# Patient Record
Sex: FEMALE | Race: WHITE
[De-identification: names, ages, dates, MRNs, and addresses within clinical notes are randomized per-mention and may not be internally consistent; named-entity substitution may affect disease eponyms.]

---

## 2022-12-13 ENCOUNTER — HOSPITAL ENCOUNTER (INPATIENT)
Dept: HOSPITAL 47 - EC | Age: 82
LOS: 3 days | Discharge: HOME HEALTH | DRG: 871 | End: 2022-12-16
Attending: HOSPITALIST | Admitting: HOSPITALIST
Payer: MEDICARE

## 2022-12-13 VITALS — BODY MASS INDEX: 25.4 KG/M2

## 2022-12-13 DIAGNOSIS — I21.A1: ICD-10-CM

## 2022-12-13 DIAGNOSIS — J44.0: ICD-10-CM

## 2022-12-13 DIAGNOSIS — Z79.01: ICD-10-CM

## 2022-12-13 DIAGNOSIS — I07.1: ICD-10-CM

## 2022-12-13 DIAGNOSIS — I48.0: ICD-10-CM

## 2022-12-13 DIAGNOSIS — Z79.899: ICD-10-CM

## 2022-12-13 DIAGNOSIS — E87.1: ICD-10-CM

## 2022-12-13 DIAGNOSIS — N18.9: ICD-10-CM

## 2022-12-13 DIAGNOSIS — A41.50: Primary | ICD-10-CM

## 2022-12-13 DIAGNOSIS — E11.22: ICD-10-CM

## 2022-12-13 DIAGNOSIS — J15.6: ICD-10-CM

## 2022-12-13 DIAGNOSIS — I27.23: ICD-10-CM

## 2022-12-13 DIAGNOSIS — E87.20: ICD-10-CM

## 2022-12-13 DIAGNOSIS — J44.1: ICD-10-CM

## 2022-12-13 DIAGNOSIS — Z79.84: ICD-10-CM

## 2022-12-13 DIAGNOSIS — I50.33: ICD-10-CM

## 2022-12-13 DIAGNOSIS — J96.21: ICD-10-CM

## 2022-12-13 DIAGNOSIS — E11.65: ICD-10-CM

## 2022-12-13 DIAGNOSIS — Z20.822: ICD-10-CM

## 2022-12-13 DIAGNOSIS — Z99.81: ICD-10-CM

## 2022-12-13 DIAGNOSIS — Z79.51: ICD-10-CM

## 2022-12-13 DIAGNOSIS — Z87.891: ICD-10-CM

## 2022-12-13 DIAGNOSIS — Z79.85: ICD-10-CM

## 2022-12-13 LAB
ALBUMIN SERPL-MCNC: 4.7 G/DL (ref 3.5–5)
ALP SERPL-CCNC: 96 U/L (ref 38–126)
ALT SERPL-CCNC: 49 U/L (ref 4–34)
ANION GAP SERPL CALC-SCNC: 23 MMOL/L
APTT BLD: 22.1 SEC (ref 22–30)
AST SERPL-CCNC: 55 U/L (ref 14–36)
BASOPHILS # BLD AUTO: 0.1 K/UL (ref 0–0.2)
BASOPHILS NFR BLD AUTO: 0 %
BUN SERPL-SCNC: 31 MG/DL (ref 7–17)
CALCIUM SPEC-MCNC: 9.8 MG/DL (ref 8.4–10.2)
CHLORIDE SERPL-SCNC: 88 MMOL/L (ref 98–107)
CO2 SERPL-SCNC: 24 MMOL/L (ref 22–30)
EOSINOPHIL # BLD AUTO: 0.1 K/UL (ref 0–0.7)
EOSINOPHIL NFR BLD AUTO: 1 %
ERYTHROCYTE [DISTWIDTH] IN BLOOD BY AUTOMATED COUNT: 3.61 M/UL (ref 3.8–5.4)
ERYTHROCYTE [DISTWIDTH] IN BLOOD: 14.1 % (ref 11.5–15.5)
GLUCOSE SERPL-MCNC: 475 MG/DL (ref 74–99)
HCT VFR BLD AUTO: 36.2 % (ref 34–46)
HGB BLD-MCNC: 11.6 GM/DL (ref 11.4–16)
INR PPP: 1 (ref ?–1.2)
LYMPHOCYTES # SPEC AUTO: 1.8 K/UL (ref 1–4.8)
LYMPHOCYTES NFR SPEC AUTO: 11 %
MAGNESIUM SPEC-SCNC: 1.4 MG/DL (ref 1.6–2.3)
MCH RBC QN AUTO: 32.3 PG (ref 25–35)
MCHC RBC AUTO-ENTMCNC: 32.2 G/DL (ref 31–37)
MCV RBC AUTO: 100.3 FL (ref 80–100)
MONOCYTES # BLD AUTO: 0.8 K/UL (ref 0–1)
MONOCYTES NFR BLD AUTO: 5 %
NEUTROPHILS # BLD AUTO: 13.3 K/UL (ref 1.3–7.7)
NEUTROPHILS NFR BLD AUTO: 82 %
PLATELET # BLD AUTO: 404 K/UL (ref 150–450)
POTASSIUM SERPL-SCNC: 4.2 MMOL/L (ref 3.5–5.1)
PROT SERPL-MCNC: 7.5 G/DL (ref 6.3–8.2)
PT BLD: 10.9 SEC (ref 9–12)
SODIUM SERPL-SCNC: 135 MMOL/L (ref 137–145)
WBC # BLD AUTO: 16.2 K/UL (ref 3.8–10.6)

## 2022-12-13 PROCEDURE — 5A09357 ASSISTANCE WITH RESPIRATORY VENTILATION, LESS THAN 24 CONSECUTIVE HOURS, CONTINUOUS POSITIVE AIRWAY PRESSURE: ICD-10-PCS

## 2022-12-13 PROCEDURE — 93005 ELECTROCARDIOGRAM TRACING: CPT

## 2022-12-13 PROCEDURE — 94660 CPAP INITIATION&MGMT: CPT

## 2022-12-13 PROCEDURE — 83880 ASSAY OF NATRIURETIC PEPTIDE: CPT

## 2022-12-13 PROCEDURE — 99292 CRITICAL CARE ADDL 30 MIN: CPT

## 2022-12-13 PROCEDURE — 87636 SARSCOV2 & INF A&B AMP PRB: CPT

## 2022-12-13 PROCEDURE — 87040 BLOOD CULTURE FOR BACTERIA: CPT

## 2022-12-13 PROCEDURE — 96367 TX/PROPH/DG ADDL SEQ IV INF: CPT

## 2022-12-13 PROCEDURE — 96375 TX/PRO/DX INJ NEW DRUG ADDON: CPT

## 2022-12-13 PROCEDURE — 80053 COMPREHEN METABOLIC PANEL: CPT

## 2022-12-13 PROCEDURE — 85730 THROMBOPLASTIN TIME PARTIAL: CPT

## 2022-12-13 PROCEDURE — 85610 PROTHROMBIN TIME: CPT

## 2022-12-13 PROCEDURE — 96365 THER/PROPH/DIAG IV INF INIT: CPT

## 2022-12-13 PROCEDURE — 84484 ASSAY OF TROPONIN QUANT: CPT

## 2022-12-13 PROCEDURE — 36415 COLL VENOUS BLD VENIPUNCTURE: CPT

## 2022-12-13 PROCEDURE — 71045 X-RAY EXAM CHEST 1 VIEW: CPT

## 2022-12-13 PROCEDURE — 83605 ASSAY OF LACTIC ACID: CPT

## 2022-12-13 PROCEDURE — 99291 CRITICAL CARE FIRST HOUR: CPT

## 2022-12-13 PROCEDURE — 96368 THER/DIAG CONCURRENT INF: CPT

## 2022-12-13 PROCEDURE — 80048 BASIC METABOLIC PNL TOTAL CA: CPT

## 2022-12-13 PROCEDURE — 94760 N-INVAS EAR/PLS OXIMETRY 1: CPT

## 2022-12-13 PROCEDURE — 94640 AIRWAY INHALATION TREATMENT: CPT

## 2022-12-13 PROCEDURE — 93306 TTE W/DOPPLER COMPLETE: CPT

## 2022-12-13 PROCEDURE — 85025 COMPLETE CBC W/AUTO DIFF WBC: CPT

## 2022-12-13 PROCEDURE — 96366 THER/PROPH/DIAG IV INF ADDON: CPT

## 2022-12-13 PROCEDURE — 83036 HEMOGLOBIN GLYCOSYLATED A1C: CPT

## 2022-12-13 PROCEDURE — 83735 ASSAY OF MAGNESIUM: CPT

## 2022-12-13 PROCEDURE — 85379 FIBRIN DEGRADATION QUANT: CPT

## 2022-12-13 NOTE — XR
EXAMINATION TYPE: XR chest 1V portable

 

DATE OF EXAM: 12/13/2022

 

COMPARISON: NONE

 

HISTORY: Hypoxemia

 

TECHNIQUE: Single view

 

FINDINGS: Heart is normal. There is some minimal infiltrate and atelectasis right lung base. There ar
e no hilar masses. No heart failure. There is some coarse markings left lower lobe.

 

IMPRESSION: Interstitial infiltrates and atelectasis in the lower lung fields. Normal heart.

## 2022-12-13 NOTE — ED
General Adult HPI





- General


Chief complaint: Shortness of Breath


Stated complaint: SOB


Time Seen by Provider: 12/13/22 22:01


Source: patient


Mode of arrival: ambulatory


Limitations: no limitations





- History of Present Illness


Initial comments: 


Dictation was produced using dragon dictation software. please excuse any 

grammatical, word or spelling errors. 











Chief Complaint: 82-year-old female presents with dyspnea





History of present illness obtained from grandson and patient





History of Present Illness: Patient is an 82-year-old female with extensive 

history of COPD.  She moved within the month from [place] where most of her care

was performed.  Patient moved here recently to be with family.  Patient wears 6 

L nasal cannula usually at home.  States that her nose is been stuffed.  Denies 

any chest pain.  No fever or constitutional symptoms.  She has a baseline cough.

 She was 6 L of nasal cannula checked her oxygen today no 63%.  She had her 

first appointment with local pulmonologists here about a week ago.  She's been 

doing her breathing treatments around-the-clock.  Patient was shortness of 

breath is worse when she lies flat.  Denies any leg swelling.  No history of 

blood clots.  Does not taking any coagulation medications.








The ROS documented in this emergency department record has been reviewed and 

confirmed by me.  Those systems with pertinent positive or negative responses 

have been documented in the HPI.  All other systems are other negative and/or 

noncontributory.








PHYSICAL EXAM:


General Impression: Alert and oriented x3, dyspneic


HEENT: Normocephalic atraumatic, extra-ocular movements intact, pupils equal and

reactive to light bilaterally, dry mucous membranes


Cardiovascular: Heart regular rate and rhythm


Chest: 4-5 word sentences, no retractions, no tachypnea


Abdomen: abdomen soft, non-tender, non-distended, no organomegaly


Musculoskeletal: Pulses present and equal in all extremities, no peripheral 

edema


Motor:  no focal deficits noted


Neurological: CN II-XII grossly intact, no focal motor or sensory deficits noted


Skin: Intact with no visualized rashes


Psych: Normal affect and mood





ED course: 82-year-old female presents emergency department for shortness of 

breath 1 day.  Vital signs upon arrival shows 75% on room air, heart rate of 

120, respiratory 30, rest of vital signs within acceptable limits.  Auscultation

of the lungs does not reveal any obvious wheezing or rhonchi.  She does have 

bilateral breath sounds.  Patient placed on nonrebreather temporarily with 

improvement of oxygenation to 97%.  Patient placed on low setting BiPAP.





Nursing notes and chart review was performed





Laboratory evaluation obtained showing leukocytosis 16.2.  Coag panel is 

negative.  D-dimer is 0.23 making pulmonary embolism highly unlikely.  Metabolic

panel shows positive anion gap with a lactic acidosis of 9.4.  Glucose 475.  

Magnesium 1.4.  Magnesium replaced.  Troponin elevated 0.063 with a pre-nitric 

peptide of 2000.  Chest x-ray shows interstitial infiltrates.  Viral testing 

pending.  Patient reevaluated after several minutes on BiPAP with improvement of

oxygenation.  She is comfortable.  At this point patient's hypoxia and shortness

of breath is likely multifactorial.  Point of care ultrasound did not reveal any

curly B lines to increase the likelihood of CHF though she does have elevated 

brain natruretic peptide and cardiac enzymes.  Suspect that symptoms are 

secondary to obstructive versus restrictive lung process.  Patient currently 

with antibiotics.  We will withhold any aggressive fluid hydration at this time 

given that heart failure is part of the differential and more fluids may be 

harmful.  lactic acidosis is likely secondary to hypoxia as opposed to sepsis.








My EKG interpretation: Ventricular rate 120, sinus tachycardia, NE interval 108,

QRS 80, QTc 394. No NE prolongation, no QTC prolongation, no ST or T-wave 

changes noted.    Overall, this EKG is unremarkable





Critical Care: yes


Critical Care time: 76 minutes








- Related Data


                                    Allergies











Allergy/AdvReac Type Severity Reaction Status Date / Time


 


No Known Allergies Allergy   Verified 12/13/22 22:03














Review of Systems


ROS Statement: 


Those systems with pertinent positive or pertinent negative responses have been 

documented in the HPI.





ROS Other: All systems not noted in ROS Statement are negative.





Past Medical History


Past Medical History: COPD


History of Any Multi-Drug Resistant Organisms: None Reported


Past Surgical History: No Surgical Hx Reported


Past Psychological History: No Psychological Hx Reported


Smoking Status: Former smoker


Past Alcohol Use History: None Reported


Past Drug Use History: None Reported





General Exam


Limitations: no limitations





Course


                                   Vital Signs











  12/13/22 12/13/22 12/13/22





  22:00 22:12 22:24


 


Temperature 97.7 F  


 


Pulse Rate 128 H 124 H 


 


Respiratory 38 H 30 H 





Rate   


 


Blood Pressure 122/63 116/76 


 


O2 Sat by Pulse 75 L 80 L 





Oximetry   


 


Fraction of   50





Inspired Oxygen   





(FIO2)   














Medical Decision Making





- Lab Data


Result diagrams: 


                                 12/13/22 22:17





                                 12/13/22 22:17


                                   Lab Results











  12/13/22 12/13/22 12/13/22 Range/Units





  22:17 22:17 22:17 


 


WBC  16.2 H    (3.8-10.6)  k/uL


 


RBC  3.61 L    (3.80-5.40)  m/uL


 


Hgb  11.6    (11.4-16.0)  gm/dL


 


Hct  36.2    (34.0-46.0)  %


 


MCV  100.3 H    (80.0-100.0)  fL


 


MCH  32.3    (25.0-35.0)  pg


 


MCHC  32.2    (31.0-37.0)  g/dL


 


RDW  14.1    (11.5-15.5)  %


 


Plt Count  404    (150-450)  k/uL


 


MPV  8.4    


 


Neutrophils %  82    %


 


Lymphocytes %  11    %


 


Monocytes %  5    %


 


Eosinophils %  1    %


 


Basophils %  0    %


 


Neutrophils #  13.3 H    (1.3-7.7)  k/uL


 


Lymphocytes #  1.8    (1.0-4.8)  k/uL


 


Monocytes #  0.8    (0-1.0)  k/uL


 


Eosinophils #  0.1    (0-0.7)  k/uL


 


Basophils #  0.1    (0-0.2)  k/uL


 


Hypochromasia  Slight    


 


Macrocytosis  Slight    


 


PT   10.9   (9.0-12.0)  sec


 


INR   1.0   (<1.2)  


 


APTT   22.1   (22.0-30.0)  sec


 


D-Dimer   0.23   (<0.60)  mg/L FEU


 


Sodium    135 L  (137-145)  mmol/L


 


Potassium    4.2  (3.5-5.1)  mmol/L


 


Chloride    88 L  ()  mmol/L


 


Carbon Dioxide    24  (22-30)  mmol/L


 


Anion Gap    23  mmol/L


 


BUN    31 H  (7-17)  mg/dL


 


Creatinine    1.30 H  (0.52-1.04)  mg/dL


 


Est GFR (CKD-EPI)AfAm    45  (>60 ml/min/1.73 sqM)  


 


Est GFR (CKD-EPI)NonAf    39  (>60 ml/min/1.73 sqM)  


 


Glucose    475 H  (74-99)  mg/dL


 


Plasma Lactic Acid Jesse     (0.7-2.0)  mmol/L


 


Calcium    9.8  (8.4-10.2)  mg/dL


 


Magnesium    1.4 L  (1.6-2.3)  mg/dL


 


Total Bilirubin    1.0  (0.2-1.3)  mg/dL


 


AST    55 H  (14-36)  U/L


 


ALT    49 H  (4-34)  U/L


 


Alkaline Phosphatase    96  ()  U/L


 


Troponin I     (0.000-0.034)  ng/mL


 


NT-Pro-B Natriuret Pep     pg/mL


 


Total Protein    7.5  (6.3-8.2)  g/dL


 


Albumin    4.7  (3.5-5.0)  g/dL














  12/13/22 12/13/22 12/13/22 Range/Units





  22:17 22:17 22:17 


 


WBC     (3.8-10.6)  k/uL


 


RBC     (3.80-5.40)  m/uL


 


Hgb     (11.4-16.0)  gm/dL


 


Hct     (34.0-46.0)  %


 


MCV     (80.0-100.0)  fL


 


MCH     (25.0-35.0)  pg


 


MCHC     (31.0-37.0)  g/dL


 


RDW     (11.5-15.5)  %


 


Plt Count     (150-450)  k/uL


 


MPV     


 


Neutrophils %     %


 


Lymphocytes %     %


 


Monocytes %     %


 


Eosinophils %     %


 


Basophils %     %


 


Neutrophils #     (1.3-7.7)  k/uL


 


Lymphocytes #     (1.0-4.8)  k/uL


 


Monocytes #     (0-1.0)  k/uL


 


Eosinophils #     (0-0.7)  k/uL


 


Basophils #     (0-0.2)  k/uL


 


Hypochromasia     


 


Macrocytosis     


 


PT     (9.0-12.0)  sec


 


INR     (<1.2)  


 


APTT     (22.0-30.0)  sec


 


D-Dimer     (<0.60)  mg/L FEU


 


Sodium     (137-145)  mmol/L


 


Potassium     (3.5-5.1)  mmol/L


 


Chloride     ()  mmol/L


 


Carbon Dioxide     (22-30)  mmol/L


 


Anion Gap     mmol/L


 


BUN     (7-17)  mg/dL


 


Creatinine     (0.52-1.04)  mg/dL


 


Est GFR (CKD-EPI)AfAm     (>60 ml/min/1.73 sqM)  


 


Est GFR (CKD-EPI)NonAf     (>60 ml/min/1.73 sqM)  


 


Glucose     (74-99)  mg/dL


 


Plasma Lactic Acid Jesse  9.4 H*    (0.7-2.0)  mmol/L


 


Calcium     (8.4-10.2)  mg/dL


 


Magnesium     (1.6-2.3)  mg/dL


 


Total Bilirubin     (0.2-1.3)  mg/dL


 


AST     (14-36)  U/L


 


ALT     (4-34)  U/L


 


Alkaline Phosphatase     ()  U/L


 


Troponin I   0.063 H*   (0.000-0.034)  ng/mL


 


NT-Pro-B Natriuret Pep    2170  pg/mL


 


Total Protein     (6.3-8.2)  g/dL


 


Albumin     (3.5-5.0)  g/dL














Disposition


Clinical Impression: 


 Hypoxia





Disposition: ADMITTED AS IP TO THIS [facility]


Condition: Serious


Referrals: 


None,Stated [REFERRING] - 1-2 days


Decision Time: 00:17

## 2022-12-14 LAB
GLUCOSE BLD-MCNC: 247 MG/DL (ref 70–110)
GLUCOSE BLD-MCNC: 261 MG/DL (ref 70–110)

## 2022-12-14 RX ADMIN — INSULIN ASPART SCH UNIT: 100 INJECTION, SOLUTION INTRAVENOUS; SUBCUTANEOUS at 20:10

## 2022-12-14 RX ADMIN — APIXABAN SCH MG: 2.5 TABLET, FILM COATED ORAL at 20:10

## 2022-12-14 RX ADMIN — MAGNESIUM SULFATE IN DEXTROSE SCH MLS/HR: 10 INJECTION, SOLUTION INTRAVENOUS at 00:57

## 2022-12-14 RX ADMIN — PIPERACILLIN AND TAZOBACTAM SCH MLS/HR: 3; .375 INJECTION, POWDER, FOR SOLUTION INTRAVENOUS at 07:36

## 2022-12-14 RX ADMIN — CEFAZOLIN SCH MLS/HR: 330 INJECTION, POWDER, FOR SOLUTION INTRAMUSCULAR; INTRAVENOUS at 00:27

## 2022-12-14 RX ADMIN — INSULIN DETEMIR SCH UNIT: 100 INJECTION, SOLUTION SUBCUTANEOUS at 20:10

## 2022-12-14 RX ADMIN — PIPERACILLIN AND TAZOBACTAM SCH MLS/HR: 3; .375 INJECTION, POWDER, FOR SOLUTION INTRAVENOUS at 23:30

## 2022-12-14 RX ADMIN — IPRATROPIUM BROMIDE AND ALBUTEROL SULFATE SCH ML: .5; 3 SOLUTION RESPIRATORY (INHALATION) at 15:30

## 2022-12-14 RX ADMIN — BUDESONIDE AND FORMOTEROL FUMARATE DIHYDRATE SCH PUFF: 80; 4.5 AEROSOL RESPIRATORY (INHALATION) at 20:54

## 2022-12-14 RX ADMIN — PIPERACILLIN AND TAZOBACTAM SCH MLS/HR: 3; .375 INJECTION, POWDER, FOR SOLUTION INTRAVENOUS at 15:45

## 2022-12-14 RX ADMIN — FUROSEMIDE SCH MG: 10 INJECTION, SOLUTION INTRAMUSCULAR; INTRAVENOUS at 12:34

## 2022-12-14 RX ADMIN — IPRATROPIUM BROMIDE AND ALBUTEROL SULFATE SCH ML: .5; 3 SOLUTION RESPIRATORY (INHALATION) at 20:54

## 2022-12-14 RX ADMIN — IPRATROPIUM BROMIDE AND ALBUTEROL SULFATE SCH: .5; 3 SOLUTION RESPIRATORY (INHALATION) at 15:30

## 2022-12-14 RX ADMIN — MAGNESIUM SULFATE IN DEXTROSE SCH MLS/HR: 10 INJECTION, SOLUTION INTRAVENOUS at 00:27

## 2022-12-14 NOTE — HP
HISTORY AND PHYSICAL



CHIEF COMPLAINT:

Shortness of breath.



HISTORY OF PRESENT ILLNESS:

This 82-year-old woman with a past medical history of multiple medical issues, 
COPD, is

complaining of increasing shortness of breath for the last several days.  The 
patient

is apparently on 6 L nasal cannula at home and the patient was apparently in 
[place]

from where the patient recently moved.  The viral panel was apparently negative.
 The

chest x-ray which was reviewed personally showed bilateral interstitial 
pneumonia and

the patient was admitted for evaluated and treatment.  There is no history of 
any

fever, rigors, or chills at this time.



PAST MEDICAL HISTORY:

Reviewed, include COPD, rest of the history and all charts reviewed.



HOME MEDICATIONS:

Reviewed include prednisone, doses and rest of medications reviewed.



ALLERGIES:

None.



FAMILY HISTORY:

No history of heart disease or strokes in the family.



SOCIAL HISTORY:

Previous history of smoking.



REVIEW OF SYSTEMS:

A 14-point review is negative except as mentioned earlier.



PHYSICAL EXAMINATION:

VITAL SIGNS:  Pulse 84, blood pressure 106/50, respirations 19.

HEENT:  Conjunctivae normal.

NECK:  No jugular venous distention.

RESPIRATIONS:  Diminished breath sounds at the basis.  Bilateral scattered 
rhonchi and

crackles.

ABDOMEN:  Soft.

LEGS:  No edema, no swelling.

NERVOUS SYSTEM:  No focal deficit.



LABS:

Viral titers negative.  Lactic acid is 4.  WBC 16.6.  Other labs are reviewed.



ASSESSMENT:

1. Chronic obstructive pulmonary disease acute exacerbation with possible 
bilateral

    pneumonia, possibly gram-negative.

2. Troponin 0.063.  Rule out acute non-ST-segment-elevation myocardial 
infarction.

3. Hyponatremia.

4. increased WBC.



RECOMMENDATIONS:

This 82-year-old woman presented with multiple complex medical issues, we will 
monitor

the patient closely.  Recommended to continue current medications, symptomatic

treatment.  Otherwise, I would recommend broad-spectrum IV antibiotics,

bronchodilators, IV steroids, and Pulmonary consultation.  The prognosis is 
guarded

because of multiple complex medical conditions.  Further recommendations to 
follow.

See orders for details.  Dr. Calero has also been consulted for possible 
pneumonia.





MMODL / IJN: [id number] / Job#: [id number]

MTDD

## 2022-12-14 NOTE — P.CRDCN
History of Present Illness


Consult date: 12/14/22


History of present illness: 





History of Present Illness:


The patient is an 82-year-old female with known history of severe chronic 

obstructive lung disease, on home oxygen who stopped smoking about a year ago, 

moved from [place] recently who presented to the hospital with an episode of 

acute dyspnea.  She has chronic dyspnea on exertion much worse yesterday with 

symptoms of wheezing and cough.  She has paroxysmal atrial fibrillation and has 

been anticoagulated but she was in sinus mechanism on presentation.  She had no 

chest discomfort, peripheral edema, dizziness or syncope.  She has occasional 

palpitations.  She has no prior history of documented obstructive coronary 

artery disease.  She has no clear PND or orthopnea.  She used to  smoke heavily 

until last year.  Her coronary risk factors are positive for diabetes in the 

prior history of smoking.  She had no documented hypertension or hyperlipidemia 

in the past.  She seen in the emergency room on BiPAP, feeling better.  She was 

scheduled to be seen by Dr. Hennessy regarding her pulmonary status.  In the [facility] room her troponin was 0.063 and her NT proBNP 2170.  She was in sinus 

mechanism.


Medications: Metformin 1 g twice a day,Prednione, Farxiga, Eliquis 2.5 mg twice 

a day





Review of Systems:


Respiratory: She has a history of severe COPD, oxygen dependent with symptoms of

dyspnea on exertion and wheezing


GI: No nausea or vomiting . No history of peptic ulcer disease. No recent GI 

bleed.


: No hematuria or dysuria.


Nervous System: No stroke or seizure.





Physical Examination: 


82-year-old female mildly dyspneic on BiPAP ,Blood pressure 106/58, Heart rate 

84


Head: Normocephalic.


Eyes: Sclerae nonicteric.


Neck: Good carotid upstroke, no bruit, no jugular venous distention.


Lungs: Decreased breath sounds with no wheezes


Heart: Regular rate and rhythm, S1-S2, no S3, no rub.  Holosystolic murmur.


Abdomen: Soft nontender, positive bowel sounds no organomegaly.


Extremities: No edema, intact distal pulses.





Labs: 


BUN 31, creatinine 1.3, potassium 4.2.  White blood cells 16.2, hemoglobin 11.6.

 Chest x-ray revealed interstitial infiltrates


EKG:


Sinus tachycardia with nonspecific ST-T wave changes


Impression:


1.  Exacerbation of COPD was progressive dyspnea.  No clear evidence of fluid 

overload


2.  Mild troponin elevation most likely type II myocardial infarction


3.  History of paroxysmal atrial fibrillation, anticoagulated


4.  History of chronic severe COPD


5.  History of smoking, stopped one year ago


6.  History of diabetes





Plan:


1.  Continued anticoagulation


2.  Obtain an echocardiogram with Doppler


3.  Follow troponin


4.  Depending on her progress further recommendations will be made


5.  Thank you for this consult we will follow with you





Past Medical History


Past Medical History: COPD


History of Any Multi-Drug Resistant Organisms: None Reported


Past Surgical History: No Surgical Hx Reported


Past Psychological History: No Psychological Hx Reported


Smoking Status: Former smoker


Past Alcohol Use History: None Reported


Past Drug Use History: None Reported





Medications and Allergies


                                Home Medications











 Medication  Instructions  Recorded  Confirmed  Type


 


Apixaban [Eliquis] 2.5 mg PO BID 12/14/22 12/14/22 History


 


Cyanocobalamin [Vitamin B-12 1,000 mcg SQ QMONTHLY 12/14/22 12/14/22 History





Injection]    


 


Dapagliflozin Propanediol [Farxiga] 10 mg PO DAILY 12/14/22 12/14/22 History


 


Fluticasone Propion/Salmeterol 1 puff INHALATION RT-BID 12/14/22 12/14/22 

History





[Advair 250-50 Diskus]    


 


Furosemide [Lasix] 40 mg PO AS DIRECTED 12/14/22 12/14/22 History


 


Ipratropium-Albuterol Nebulize 3 ml INHALATION AS DIRECTED 12/14/22 12/14/22 

History





[Duoneb 0.5 mg-3 mg/3 ml Soln]    


 


Ipratropium/Albuter 20-100Mcg 1 puff INHALATION RT-QID 12/14/22 12/14/22 History





[Combivent Respimat 20-100Mcg    





Inhaler]    


 


Semaglutide [Ozempic] 0.5 mg SQ TH 12/14/22 12/14/22 History


 


metFORMIN HCL [Glucophage] 1,000 mg PO BID 12/14/22 12/14/22 History


 


predniSONE See Taper PO AS DIRECTED 12/14/22 12/14/22 History








                                    Allergies











Allergy/AdvReac Type Severity Reaction Status Date / Time


 


No Known Allergies Allergy   Verified 12/14/22 10:04














Physical Exam


Vitals: 


                                   Vital Signs











  Temp Pulse Resp BP Pulse Ox FiO2


 


 12/14/22 11:15       40


 


 12/14/22 07:55       40


 


 12/14/22 07:31   84  19  106/58  95 


 


 12/14/22 04:46   90  19  123/74  100 


 


 12/14/22 04:20       40


 


 12/14/22 02:47   85  23  116/76  100 


 


 12/14/22 01:10   128 H    


 


 12/14/22 00:59   120 H     40


 


 12/13/22 22:24       50


 


 12/13/22 22:12   124 H  30 H  116/76  80 L 


 


 12/13/22 22:00  97.7 F  128 H  38 H  122/63  75 L 








                                Intake and Output











 12/13/22 12/14/22 12/14/22





 22:59 06:59 14:59


 


Other:   


 


  Weight 58.967 kg  














Results





                                 12/13/22 22:17





                                 12/13/22 22:17


                                 Cardiac Enzymes











  12/13/22 12/13/22 Range/Units





  22:17 22:17 


 


AST  55 H   (14-36)  U/L


 


Troponin I   0.063 H*  (0.000-0.034)  ng/mL








                                   Coagulation











  12/13/22 Range/Units





  22:17 


 


PT  10.9  (9.0-12.0)  sec


 


APTT  22.1  (22.0-30.0)  sec








                                       CBC











  12/13/22 Range/Units





  22:17 


 


WBC  16.2 H  (3.8-10.6)  k/uL


 


RBC  3.61 L  (3.80-5.40)  m/uL


 


Hgb  11.6  (11.4-16.0)  gm/dL


 


Hct  36.2  (34.0-46.0)  %


 


Plt Count  404  (150-450)  k/uL








                          Comprehensive Metabolic Panel











  12/13/22 Range/Units





  22:17 


 


Sodium  135 L  (137-145)  mmol/L


 


Potassium  4.2  (3.5-5.1)  mmol/L


 


Chloride  88 L  ()  mmol/L


 


Carbon Dioxide  24  (22-30)  mmol/L


 


BUN  31 H  (7-17)  mg/dL


 


Creatinine  1.30 H  (0.52-1.04)  mg/dL


 


Glucose  475 H  (74-99)  mg/dL


 


Calcium  9.8  (8.4-10.2)  mg/dL


 


AST  55 H  (14-36)  U/L


 


ALT  49 H  (4-34)  U/L


 


Alkaline Phosphatase  96  ()  U/L


 


Total Protein  7.5  (6.3-8.2)  g/dL


 


Albumin  4.7  (3.5-5.0)  g/dL








                               Current Medications











Generic Name Dose Route Start Last Admin





  Trade Name Freq  PRN Reason Stop Dose Admin


 


Acetaminophen  650 mg  12/13/22 23:56 





  Acetaminophen Tab 325 Mg Tab  PO  





  Q6HR PRN  





  Mild Pain or Fever > 100.5  


 


Albuterol/Ipratropium  3 ml  12/14/22 12:00 





  Ipratropium-Albuterol 3 Ml Neb  INHALATION  





  RT-QID RADHA  


 


Albuterol/Ipratropium  3 ml  12/14/22 11:21 





  Ipratropium-Albuterol 3 Ml Neb  INHALATION  





  RT-QID PRN  





  Shortness Of Breath Or Wheezing  


 


Apixaban  2.5 mg  12/14/22 21:00 





  Apixaban 2.5 Mg Tablet  PO  





  BID RADHA  





  Protocol  


 


Budesonide/Formoterol Fumarate  2 puff  12/14/22 20:00 





  Symbicort 80-4.5 Mcg Inhaler  INHALATION  





  RT-BID RADHA  


 


Dapagliflozin  10 mg  12/15/22 09:00 





  Dapagliflozin Propanediol 10 Mg Tablet  PO  





  DAILY RADHA  


 


Furosemide  20 mg  12/14/22 11:30 





  Furosemide 10 Mg/Ml 2 Ml Vial  IV  





  DAILY RADHA  


 


Piperacillin Sod/Tazobactam  100 mls @ 25 mls/hr  12/14/22 08:00  12/14/22 07:36





  Sod 3.375 gm/ Sodium Chloride  IVPB   25 mls/hr





  Q8HR RADHA   Administration





  Protocol  


 


Sodium Chloride  1,000 mls @ 20 mls/hr  12/13/22 23:45  12/14/22 00:27





  Saline 0.9%  IV   20 mls/hr





  .Q24H RADHA   Administration


 


Metformin HCl  1,000 mg  12/14/22 21:00 





  Metformin 500 Mg Tab  PO  





  BID RADHA  


 


Naloxone HCl  0.2 mg  12/13/22 23:56 





  Naloxone 0.4 Mg/Ml 1 Ml Vial  IV  





  Q2M PRN  





  Opioid Reversal  


 


Pantoprazole Sodium  40 mg  12/15/22 07:30 





  Pantoprazole 40 Mg Tablet  PO  





  AC-BRKFST RADHA  








                                Intake and Output











 12/13/22 12/14/22 12/14/22





 22:59 06:59 14:59


 


Other:   


 


  Weight 58.967 kg  








                                        





                                 12/13/22 22:17 





                                 12/13/22 22:17

## 2022-12-15 LAB
GLUCOSE BLD-MCNC: 144 MG/DL (ref 70–110)
GLUCOSE BLD-MCNC: 171 MG/DL (ref 70–110)
GLUCOSE BLD-MCNC: 189 MG/DL (ref 70–110)
GLUCOSE BLD-MCNC: 73 MG/DL (ref 70–110)

## 2022-12-15 RX ADMIN — CEFAZOLIN SCH MLS/HR: 330 INJECTION, POWDER, FOR SOLUTION INTRAMUSCULAR; INTRAVENOUS at 23:19

## 2022-12-15 RX ADMIN — DOCUSATE SODIUM SCH MG: 100 CAPSULE, LIQUID FILLED ORAL at 21:07

## 2022-12-15 RX ADMIN — INSULIN ASPART SCH: 100 INJECTION, SOLUTION INTRAVENOUS; SUBCUTANEOUS at 06:31

## 2022-12-15 RX ADMIN — PANTOPRAZOLE SODIUM SCH MG: 40 TABLET, DELAYED RELEASE ORAL at 08:47

## 2022-12-15 RX ADMIN — PIPERACILLIN AND TAZOBACTAM SCH MLS/HR: 3; .375 INJECTION, POWDER, FOR SOLUTION INTRAVENOUS at 23:19

## 2022-12-15 RX ADMIN — INSULIN ASPART SCH: 100 INJECTION, SOLUTION INTRAVENOUS; SUBCUTANEOUS at 21:07

## 2022-12-15 RX ADMIN — INSULIN ASPART SCH UNIT: 100 INJECTION, SOLUTION INTRAVENOUS; SUBCUTANEOUS at 12:15

## 2022-12-15 RX ADMIN — CEFAZOLIN SCH: 330 INJECTION, POWDER, FOR SOLUTION INTRAMUSCULAR; INTRAVENOUS at 01:25

## 2022-12-15 RX ADMIN — PIPERACILLIN AND TAZOBACTAM SCH MLS/HR: 3; .375 INJECTION, POWDER, FOR SOLUTION INTRAVENOUS at 08:47

## 2022-12-15 RX ADMIN — APIXABAN SCH MG: 2.5 TABLET, FILM COATED ORAL at 08:47

## 2022-12-15 RX ADMIN — BUDESONIDE AND FORMOTEROL FUMARATE DIHYDRATE SCH PUFF: 80; 4.5 AEROSOL RESPIRATORY (INHALATION) at 07:26

## 2022-12-15 RX ADMIN — INSULIN DETEMIR SCH UNIT: 100 INJECTION, SOLUTION SUBCUTANEOUS at 21:08

## 2022-12-15 RX ADMIN — FUROSEMIDE SCH MG: 10 INJECTION, SOLUTION INTRAMUSCULAR; INTRAVENOUS at 08:47

## 2022-12-15 RX ADMIN — IPRATROPIUM BROMIDE AND ALBUTEROL SULFATE SCH ML: .5; 3 SOLUTION RESPIRATORY (INHALATION) at 11:13

## 2022-12-15 RX ADMIN — APIXABAN SCH MG: 2.5 TABLET, FILM COATED ORAL at 21:07

## 2022-12-15 RX ADMIN — BUDESONIDE AND FORMOTEROL FUMARATE DIHYDRATE SCH PUFF: 80; 4.5 AEROSOL RESPIRATORY (INHALATION) at 20:38

## 2022-12-15 RX ADMIN — IPRATROPIUM BROMIDE AND ALBUTEROL SULFATE SCH ML: .5; 3 SOLUTION RESPIRATORY (INHALATION) at 07:26

## 2022-12-15 RX ADMIN — IPRATROPIUM BROMIDE AND ALBUTEROL SULFATE SCH ML: .5; 3 SOLUTION RESPIRATORY (INHALATION) at 16:04

## 2022-12-15 RX ADMIN — INSULIN ASPART SCH UNIT: 100 INJECTION, SOLUTION INTRAVENOUS; SUBCUTANEOUS at 17:03

## 2022-12-15 RX ADMIN — DAPAGLIFLOZIN SCH MG: 10 TABLET, FILM COATED ORAL at 08:47

## 2022-12-15 RX ADMIN — PIPERACILLIN AND TAZOBACTAM SCH MLS/HR: 3; .375 INJECTION, POWDER, FOR SOLUTION INTRAVENOUS at 17:03

## 2022-12-15 RX ADMIN — IPRATROPIUM BROMIDE AND ALBUTEROL SULFATE SCH ML: .5; 3 SOLUTION RESPIRATORY (INHALATION) at 20:38

## 2022-12-15 NOTE — P.CNPUL
History of Present Illness


Consult date: 12/15/22


Reason for consult: dyspnea, cough, COPD, hypoxemia


Chief complaint: Shortness of breath


History of present illness: 





82-year-old female seen eval reexamined during the rounds patient has baseline 

problems COPD recently moved from [place] she was seen in the office recently 

with oxygen saturation in the 70s surprisingly awake and alert with mild 

shortness of breath, at that time patient was given option for admission and 

evaluation she declined would agree to take antibiotics and steroids, she has 

been using 6 L nasal cannula oxygen at home her oxygen saturation on 6 L drop 

down to 63% decided to come into the hospital she is on breathing treatment 4 

times a day get short of breath when lies flat denies any chest pain or 

radiation of pain no history of blood clot no history of leg swelling denies any

loss of consciousness) she has seizure like activity, denies any chest pain, 

patient was initially treated with BiPAP 10/5 with 40% oxygen and admitted into 

the hospital.  Her white cell count is significant for 16.2, hemoglobin 

hematocrit is stable 11.6/36, BUN/creatinine 31/1.3 sodium is 135.  AST/ALT mild

ly elevated 55/49, lactic acid was elevated at 9.4, troponins were 0.063, BNP is

2001 and 70 her initial chest x-ray suggestive of interstitial infiltrate and 

atelectasis at the bases.  Patient is being continued on bronchodilators along 

with oral direct oral anticoagulants, has been on statins as well with broad-

spectrum antibiotics along with Zosyn d-dimer is normal 0.230 calcitonin not 

done, influenza A and B RSV and covert has been negative, lactic acid came down 

to normal range of 2 within 24 hrs.





Review of Systems


All systems: negative





Past Medical History


Past Medical History: COPD


History of Any Multi-Drug Resistant Organisms: None Reported


Past Surgical History: No Surgical Hx Reported


Past Psychological History: No Psychological Hx Reported


Smoking Status: Former smoker


Past Alcohol Use History: None Reported


Past Drug Use History: None Reported





Medications and Allergies


                                Home Medications











 Medication  Instructions  Recorded  Confirmed  Type


 


Apixaban [Eliquis] 2.5 mg PO BID 12/14/22 12/14/22 History


 


Cyanocobalamin [Vitamin B-12 1,000 mcg SQ QMONTHLY 12/14/22 12/14/22 History





Injection]    


 


Dapagliflozin Propanediol [Farxiga] 10 mg PO DAILY 12/14/22 12/14/22 History


 


Fluticasone Propion/Salmeterol 1 puff INHALATION RT-BID 12/14/22 12/14/22 

History





[Advair 250-50 Diskus]    


 


Furosemide [Lasix] 40 mg PO AS DIRECTED 12/14/22 12/14/22 History


 


Ipratropium-Albuterol Nebulize 3 ml INHALATION AS DIRECTED 12/14/22 12/14/22 

History





[Duoneb 0.5 mg-3 mg/3 ml Soln]    


 


Ipratropium/Albuter 20-100Mcg 1 puff INHALATION RT-QID 12/14/22 12/14/22 History





[Combivent Respimat 20-100Mcg    





Inhaler]    


 


Semaglutide [Ozempic] 0.5 mg SQ TH 12/14/22 12/14/22 History


 


metFORMIN HCL [Glucophage] 1,000 mg PO BID 12/14/22 12/14/22 History


 


predniSONE See Taper PO AS DIRECTED 12/14/22 12/14/22 History








                                    Allergies











Allergy/AdvReac Type Severity Reaction Status Date / Time


 


No Known Allergies Allergy   Verified 12/14/22 10:04














Physical Exam


Vitals: 


                                   Vital Signs











  Temp Pulse Pulse Resp BP BP Pulse Ox


 


 12/15/22 08:43  97.7 F   82  16   106/62  90 L


 


 12/15/22 07:39   82     


 


 12/15/22 07:29        95


 


 12/15/22 07:26   78     


 


 12/14/22 23:51    77  18   100/49  93 L


 


 12/14/22 21:09   84     


 


 12/14/22 20:54   80     


 


 12/14/22 20:00  97.8 F   74  19   108/63  92 L


 


 12/14/22 16:52     16   


 


 12/14/22 16:47  98 F   77  16   122/68  90 L


 


 12/14/22 16:15   79   20    97


 


 12/14/22 15:45   76   19  130/67   97


 


 12/14/22 15:41   80     


 


 12/14/22 15:30   76     


 


 12/14/22 13:00   74   16  129/69   99


 


 12/14/22 11:15       














  FiO2


 


 12/15/22 08:43 


 


 12/15/22 07:39 


 


 12/15/22 07:29 


 


 12/15/22 07:26 


 


 12/14/22 23:51 


 


 12/14/22 21:09 


 


 12/14/22 20:54 


 


 12/14/22 20:00 


 


 12/14/22 16:52 


 


 12/14/22 16:47 


 


 12/14/22 16:15 


 


 12/14/22 15:45 


 


 12/14/22 15:41 


 


 12/14/22 15:30  40


 


 12/14/22 13:00 


 


 12/14/22 11:15  40








                                Intake and Output











 12/14/22 12/15/22 12/15/22





 22:59 06:59 14:59


 


Output Total 150  


 


Balance -150  


 


Output:   


 


  Other 150  


 


Other:   


 


  Voiding Method Toilet  


 


  # Voids 1 1 














- Constitutional


General appearance: average body habitus, cooperative, disheveled, mild distress





- EENT


Eyes: EOMI, PERRLA


Ears: bilateral: normal





- Neck


Neck: normal ROM


Carotids: bilateral: upstroke normal


Thyroid: bilateral: normal size





- Respiratory


Respiratory: bilateral: diminished





- Cardiovascular


Rhythm: regular


Heart sounds: normal: S1, S2





- Gastrointestinal


General gastrointestinal: soft





- Integumentary


Integumentary: normal turgor





- Neurologic


Neurologic: CNII-XII intact





- Musculoskeletal


Musculoskeletal: gait normal, generalized weakness, strength equal bilaterally





- Psychiatric


Psychiatric: A&O x's 3, appropriate affect, intact judgment & insight





Results





- Laboratory Findings


CBC and BMP: 


                                 12/13/22 22:17





                                 12/13/22 22:17


PT/INR, D-dimer











PT  10.9 sec (9.0-12.0)   12/13/22  22:17    


 


INR  1.0  (<1.2)   12/13/22  22:17    


 


D-Dimer  0.23 mg/L FEU (<0.60)   12/13/22  22:17    








Abnormal lab findings: 


                                  Abnormal Labs











  12/13/22 12/13/22 12/13/22





  22:17 22:17 22:17


 


WBC  16.2 H  


 


RBC  3.61 L  


 


MCV  100.3 H  


 


Neutrophils #  13.3 H  


 


Sodium   135 L 


 


Chloride   88 L 


 


BUN   31 H 


 


Creatinine   1.30 H 


 


Glucose   475 H 


 


POC Glucose (mg/dL)   


 


Hemoglobin A1c   


 


Plasma Lactic Acid Jesse    9.4 H*


 


Magnesium   1.4 L 


 


AST   55 H 


 


ALT   49 H 


 


Troponin I   














  12/13/22 12/13/22 12/14/22





  22:17 22:17 01:40


 


WBC   


 


RBC   


 


MCV   


 


Neutrophils #   


 


Sodium   


 


Chloride   


 


BUN   


 


Creatinine   


 


Glucose   


 


POC Glucose (mg/dL)   


 


Hemoglobin A1c   9.8 H 


 


Plasma Lactic Acid Jesse    5.0 H*


 


Magnesium   


 


AST   


 


ALT   


 


Troponin I  0.063 H*  














  12/14/22 12/14/22 12/14/22





  05:27 09:43 12:46


 


WBC   


 


RBC   


 


MCV   


 


Neutrophils #   


 


Sodium   


 


Chloride   


 


BUN   


 


Creatinine   


 


Glucose   


 


POC Glucose (mg/dL)   


 


Hemoglobin A1c   


 


Plasma Lactic Acid Jesse  4.0 H*  3.3 H*  4.2 H*


 


Magnesium   


 


AST   


 


ALT   


 


Troponin I   














  12/14/22 12/14/22





  16:58 20:02


 


WBC  


 


RBC  


 


MCV  


 


Neutrophils #  


 


Sodium  


 


Chloride  


 


BUN  


 


Creatinine  


 


Glucose  


 


POC Glucose (mg/dL)  247 H  261 H


 


Hemoglobin A1c  


 


Plasma Lactic Acid Jesse  


 


Magnesium  


 


AST  


 


ALT  


 


Troponin I  














- Diagnostic Findings


Chest x-ray: report reviewed, image reviewed (Finding as noted above)





Assessment and Plan


Assessment: 





Acute on chronic hypoxic respiratory failure





Mixed bacterial pneumonia community-acquired versus gram-negative





Sepsis





Hyperglycemia and uncontrolled diabetes mellitus with hyperglycemia





Chronic kidney disease





Mild CHF likely acute on chronic diastolic heart failure with pulmonary 

hypertension group 3


Plan: 





Broad-spectrum antibiotics





Gentle diuresis





Bronchodilator





Continue supplemental oxygen during the day and BiPAP support each night and 

when necessary during day





Further plan of care as per clinical response of the patient


Time with Patient: Greater than 30

## 2022-12-15 NOTE — P.PN
Subjective


Progress Note Date: 12/15/22





PROGRESS NOTE


The patient is an 82-year-old female with known history of severe chronic 

obstructive lung disease, on home oxygen who stopped smoking about a year ago, 

moved from [place] recently who presented to the hospital with an episode of 

acute dyspnea.  She has chronic dyspnea on exertion much worse yesterday with 

symptoms of wheezing and cough.  She has paroxysmal atrial fibrillation and has 

been anticoagulated but she was in sinus mechanism on presentation.  She had no 

chest discomfort, peripheral edema, dizziness or syncope.  She has occasional 

palpitations.  She has no prior history of documented obstructive coronary 

artery disease.  She has no clear PND or orthopnea.  She used to  smoke heavily 

until last year.  Her coronary risk factors are positive for diabetes in the 

prior history of smoking.  She had no documented hypertension or hyperlipidemia 

in the past.  She seen in the emergency room on BiPAP, feeling better.  She was 

scheduled to be seen by Dr. Hennessy regarding her pulmonary status.  In the 

emergency room her troponin was 0.063 and her NT proBNP 2170.  She was in sinus 

mechanism.


December 15


The patient is feeling much better today, her breathing is better.  She denies 

any chest discomfort.  She is on Coumadin air.  She denies any dizziness or 

palpitation.  She denies any nausea or vomiting.  She continues to be in sinus 

mechanism and hemodynamically stable.





Medications:


Lasix 20 mg IV daily, insulin, Eliquis 10 mg qd, Farxiga 10 mg qd


PHYSICAL EXAMINATION:


Blood pressure 106/60 heart rate 80


LUNGS: Decrease air exchange bilaterally, much improved compared to yesterday


HEART: Regular rate and rhythm, S1, S2. No S3.  Holosystolic murmur


ABDOMEN: Soft, nontender, no organomegaly


EXTREMETIES: No edema





LAB:


Echo pending


IMPRESSION:


1.  Exacerbation of COPD


2.  Paroxysmal atrial fibrillation


3.  History of smoking


4.  History of diabetes


5.  Mild troponin elevation representing type II myocardial infarction





PLAN:


1.  Changed to oral diuretics


2.  Review the echocardiogram


3.  Add statin


4.  Follow renal functions


5.  Depending on her progress further recommendations will be made





 








Objective





- Vital Signs


Vital signs: 


                                   Vital Signs











Temp  97.7 F   12/15/22 08:43


 


Pulse  82   12/15/22 08:43


 


Resp  16   12/15/22 08:43


 


BP  106/62   12/15/22 08:43


 


Pulse Ox  90 L  12/15/22 08:43


 


FiO2  40   12/14/22 15:30








                                 Intake & Output











 12/14/22 12/15/22 12/15/22





 18:59 06:59 18:59


 


Output Total 150  


 


Balance -150  


 


Output:   


 


  Other 150  


 


Other:   


 


  Voiding Method Toilet  


 


  # Voids 1 1 














- Labs


CBC & Chem 7: 


                                 12/13/22 22:17





                                 12/13/22 22:17


Labs: 


                  Abnormal Lab Results - Last 24 Hours (Table)











  12/13/22 12/14/22 12/14/22 Range/Units





  22:17 09:43 12:46 


 


POC Glucose (mg/dL)     ()  mg/dL


 


Hemoglobin A1c  9.8 H    (0.0-6.0)  %


 


Plasma Lactic Acid Jesse   3.3 H*  4.2 H*  (0.7-2.0)  mmol/L














  12/14/22 12/14/22 Range/Units





  16:58 20:02 


 


POC Glucose (mg/dL)  247 H  261 H  ()  mg/dL


 


Hemoglobin A1c    (0.0-6.0)  %


 


Plasma Lactic Acid Jesse    (0.7-2.0)  mmol/L

## 2022-12-15 NOTE — P.PN
Subjective


Progress Note Date: 12/15/22


Principal diagnosis: 


Pneumonia





Patient is a 82-year-old  female with a past medical history 

significant for COPD who has recently moved from [place] to be with her family 

is presenting to the ER for evaluation of increasing shortness of breath, 

patient did have elevated white count and chest x-ray was interstitial 

infiltrate.


On today's evaluation that is 12/15/2022, the patient denies having any fever or

any chills, the patient is breathing more comfortably and is down to 6 L nasal 

cannula, the patient denies having any chest pain no worsening cough or sputum 

production no abdominal pain no diarrhea








Objective





- Vital Signs


Vital signs: 


                                   Vital Signs











Temp  97.7 F   12/15/22 08:43


 


Pulse  78   12/15/22 13:25


 


Resp  18   12/15/22 11:24


 


BP  128/66   12/15/22 11:24


 


Pulse Ox  90 L  12/15/22 11:24


 


FiO2  40   12/14/22 15:30








                                 Intake & Output











 12/14/22 12/15/22 12/15/22





 18:59 06:59 18:59


 


Intake Total   240


 


Output Total 150  300


 


Balance -150  -60


 


Weight   58.967 kg


 


Intake:   


 


  Oral   240


 


Output:   


 


  Urine   300


 


  Other 150  


 


Other:   


 


  Voiding Method Toilet  Toilet


 


  # Voids 1 1 














- Exam


GENERAL DESCRIPTION: An elderly female lying in bed in no distress





RESPIRATORY SYSTEM: Unlabored breathing , decreased breath sounds at bases





HEART: S1 S2 regular rate and rhythm ,





ABDOMEN: Soft , no tenderness





EXTREMITIES: No edema feet








- Labs


CBC & Chem 7: 


                                 12/13/22 22:17





                                 12/13/22 22:17


Labs: 


                  Abnormal Lab Results - Last 24 Hours (Table)











  12/13/22 12/14/22 12/14/22 Range/Units





  22:17 12:46 16:58 


 


POC Glucose (mg/dL)    247 H  ()  mg/dL


 


Hemoglobin A1c  9.8 H    (0.0-6.0)  %


 


Plasma Lactic Acid Jesse   4.2 H*   (0.7-2.0)  mmol/L














  12/14/22 12/15/22 Range/Units





  20:02 11:43 


 


POC Glucose (mg/dL)  261 H  189 H  ()  mg/dL


 


Hemoglobin A1c    (0.0-6.0)  %


 


Plasma Lactic Acid Jesse    (0.7-2.0)  mmol/L














Assessment and Plan


(1) Pneumonia


Current Visit: Yes   Status: Acute   Code(s): J18.9 - PNEUMONIA, UNSPECIFIED 

ORGANISM   SNOMED Code(s): 856176901


   


Plan: 


1patient presented to hospital with increasing shortness of breath and cough 

cough in this patient with underlying COPD patient did have elevated white count

and interstitial infiltrate with a negative COVID RSV influenza concerning for 

possible pneumonia with COPD exacerbation likely etiology of her symptoms.


2we're still waiting for sputum for gram stain as well as CRP and a 

procalcitonin.


3patient to continue with Zosyn while awaiting further work-up to be completed.


 


Time with Patient: Less than 30

## 2022-12-15 NOTE — P.CONS
History of Present Illness





- Reason for Consult


Consult date: 12/14/22


Pneumonia


Requesting physician: Maggie Encarnacion





- Chief Complaint


Shortness of breath x few days





- History of Present Illness


Patient is a 82-year-old  female with a past medical history 

significant for COPD who has recently moved from [place] to be with her family 

is presenting to the ER for evaluation of increasing shortness of breath that 

apparently has been getting worse since yesterday patient denies having any 

chest pain has been complaining of a cough which is mostly dry in nature and not

bring up any sputum.  Denies any nausea no vomiting no choking on the food no 

abdominal pain or any diarrhea.  On presentation to the hospital was afebrile 

and no fever have been recorded subsequently patient did have white count of 

16.2 with a left shift did have elevated lactic acid did have elevated 

BUN/creatinine, liver enzymes are mildly elevated, patient did have a negative 

influenza RSV and COVID PCR chest x-ray interstitial infiltrate atelectasis 

lower lungs patient has been admitted to the hospital started on Zosyn 

infectious disease was consulted concerning for possible pneumonia








Review of Systems


Positive point has been  mentioned in the HPI rest of the systems are negative








Past Medical History


Past Medical History: COPD


History of Any Multi-Drug Resistant Organisms: None Reported


Past Surgical History: No Surgical Hx Reported


Past Psychological History: No Psychological Hx Reported


Smoking Status: Former smoker


Past Alcohol Use History: None Reported


Past Drug Use History: None Reported





Medications and Allergies


                                Home Medications











 Medication  Instructions  Recorded  Confirmed  Type


 


Apixaban [Eliquis] 2.5 mg PO BID 12/14/22 12/14/22 History


 


Cyanocobalamin [Vitamin B-12 1,000 mcg SQ QMONTHLY 12/14/22 12/14/22 History





Injection]    


 


Dapagliflozin Propanediol [Farxiga] 10 mg PO DAILY 12/14/22 12/14/22 History


 


Fluticasone Propion/Salmeterol 1 puff INHALATION RT-BID 12/14/22 12/14/22 

History





[Advair 250-50 Diskus]    


 


Furosemide [Lasix] 40 mg PO AS DIRECTED 12/14/22 12/14/22 History


 


Ipratropium/Albuter 20-100Mcg 1 puff INHALATION RT-QID 12/14/22 12/14/22 History





[Combivent Respimat 20-100Mcg    





Inhaler]    


 


Semaglutide [Ozempic] 0.5 mg SQ TH 12/14/22 12/14/22 History


 


metFORMIN HCL [Glucophage] 1,000 mg PO BID 12/14/22 12/14/22 History


 


predniSONE See Taper PO AS DIRECTED 12/14/22 12/14/22 History


 


Acetaminophen Tab [Tylenol] 650 mg PO Q6HR PRN  tab 12/16/22  Rx


 


Amoxic-Pot Clav 875-125Mg 1 tab PO Q12HR 5 Days #10 tab 12/16/22  Rx





[Augmentin 875-125]    


 


Atorvastatin [Lipitor] 40 mg PO DAILY 30 Days #30 tab 12/16/22  Rx


 


Docusate [Colace] 100 mg PO BID 30 Days #60 cap 12/16/22  Rx


 


Furosemide [Lasix] 20 mg PO DAILY 30 Days #30 tab 12/16/22  Rx


 


Ipratropium-Albuterol Nebulize 3 ml INHALATION RT-QID  each 12/16/22  Rx





[Duoneb 0.5 mg-3 mg/3 ml Soln]    


 


Ipratropium-Albuterol Nebulize 3 ml INHALATION RT-QID PRN  each 12/16/22  Rx





[Duoneb 0.5 mg-3 mg/3 ml Soln]    


 


Pantoprazole [Protonix] 40 mg PO AC-BRKFST 30 Days #30 tab 12/16/22  Rx








                                    Allergies











Allergy/AdvReac Type Severity Reaction Status Date / Time


 


No Known Allergies Allergy   Verified 12/14/22 10:04














Physical Exam


Vitals: 


                                   Vital Signs











  Temp Pulse Resp BP Pulse Ox FiO2


 


 12/14/22 13:00   74  16  129/69  99 


 


 12/14/22 11:15       40


 


 12/14/22 07:55       40


 


 12/14/22 07:31   84  19  106/58  95 


 


 12/14/22 04:46   90  19  123/74  100 


 


 12/14/22 04:20       40


 


 12/14/22 02:47   85  23  116/76  100 


 


 12/14/22 01:10   128 H    


 


 12/14/22 00:59   120 H     40


 


 12/13/22 22:24       50


 


 12/13/22 22:12   124 H  30 H  116/76  80 L 


 


 12/13/22 22:00  97.7 F  128 H  38 H  122/63  75 L 








                                Intake and Output











 12/13/22 12/14/22 12/14/22





 22:59 06:59 14:59


 


Other:   


 


  Weight 58.967 kg  











GENERAL DESCRIPTION: Elderly female lying in bed, no distress. No tachypnea or 

accessory muscle of respiration use.


HEENT: Shows Pallor , no scleral icterus. Oral mucous membrane is dry. No 

pharyngeal erythema or thrush


NECK: Trachea central, no thyromegaly.


LUNGS: Unlabored breathing.  Coarse breath sounds bilaterally.


HEART: S1, S2, regular rate and rhythm. No loud murmur


ABDOMEN: Soft, no tenderness , guarding or rigidity, no organomegaly


EXTREMITIES: No edema of feet.


SKIN: No rash, no masses palpable.


NEUROLOGICAL: The patient is awake, alert, oriented x3, mood and affect normal.

















Results


CBC & Chem 7: 


                                 12/13/22 22:17





                                 12/16/22 06:34


Labs: 


                  Abnormal Lab Results - Last 24 Hours (Table)











  12/13/22 12/13/22 12/13/22 Range/Units





  22:17 22:17 22:17 


 


WBC  16.2 H    (3.8-10.6)  k/uL


 


RBC  3.61 L    (3.80-5.40)  m/uL


 


MCV  100.3 H    (80.0-100.0)  fL


 


Neutrophils #  13.3 H    (1.3-7.7)  k/uL


 


Sodium   135 L   (137-145)  mmol/L


 


Chloride   88 L   ()  mmol/L


 


BUN   31 H   (7-17)  mg/dL


 


Creatinine   1.30 H   (0.52-1.04)  mg/dL


 


Glucose   475 H   (74-99)  mg/dL


 


Plasma Lactic Acid Jesse    9.4 H*  (0.7-2.0)  mmol/L


 


Magnesium   1.4 L   (1.6-2.3)  mg/dL


 


AST   55 H   (14-36)  U/L


 


ALT   49 H   (4-34)  U/L


 


Troponin I     (0.000-0.034)  ng/mL














  12/13/22 12/14/22 12/14/22 Range/Units





  22:17 01:40 05:27 


 


WBC     (3.8-10.6)  k/uL


 


RBC     (3.80-5.40)  m/uL


 


MCV     (80.0-100.0)  fL


 


Neutrophils #     (1.3-7.7)  k/uL


 


Sodium     (137-145)  mmol/L


 


Chloride     ()  mmol/L


 


BUN     (7-17)  mg/dL


 


Creatinine     (0.52-1.04)  mg/dL


 


Glucose     (74-99)  mg/dL


 


Plasma Lactic Acid Jesse   5.0 H*  4.0 H*  (0.7-2.0)  mmol/L


 


Magnesium     (1.6-2.3)  mg/dL


 


AST     (14-36)  U/L


 


ALT     (4-34)  U/L


 


Troponin I  0.063 H*    (0.000-0.034)  ng/mL














  12/14/22 12/14/22 Range/Units





  09:43 12:46 


 


WBC    (3.8-10.6)  k/uL


 


RBC    (3.80-5.40)  m/uL


 


MCV    (80.0-100.0)  fL


 


Neutrophils #    (1.3-7.7)  k/uL


 


Sodium    (137-145)  mmol/L


 


Chloride    ()  mmol/L


 


BUN    (7-17)  mg/dL


 


Creatinine    (0.52-1.04)  mg/dL


 


Glucose    (74-99)  mg/dL


 


Plasma Lactic Acid Jesse  3.3 H*  4.2 H*  (0.7-2.0)  mmol/L


 


Magnesium    (1.6-2.3)  mg/dL


 


AST    (14-36)  U/L


 


ALT    (4-34)  U/L


 


Troponin I    (0.000-0.034)  ng/mL














Assessment and Plan


(1) Pneumonia


Status: Acute   Code(s): J18.9 - PNEUMONIA, UNSPECIFIED ORGANISM   SNOMED 

Code(s): 484918703


   


Plan: 


1patient presented to hospital with increasing shortness of breath and cough 

cough in this patient with underlying COPD patient did have elevated white count

 and interstitial infiltrate with a negative COVID RSV influenza concerning for 

possible pneumonia with COPD exacerbation likely etiology of her symptoms.


2we will obtain a sputum for gram stain and culture check a CRP and a 

procalcitonin.


3continue with Zosyn while awaiting further work-up to be completed.


We will follow on clinical condition and cultures to further adjust medication 

if needed


Thank you for this consultation will follow this patient along with you





Time with Patient: Greater than 30

## 2022-12-16 VITALS — TEMPERATURE: 98.3 F

## 2022-12-16 VITALS — SYSTOLIC BLOOD PRESSURE: 109 MMHG | HEART RATE: 88 BPM | DIASTOLIC BLOOD PRESSURE: 68 MMHG

## 2022-12-16 VITALS — RESPIRATION RATE: 18 BRPM

## 2022-12-16 LAB
ANION GAP SERPL CALC-SCNC: 7 MMOL/L
BUN SERPL-SCNC: 21 MG/DL (ref 7–17)
CALCIUM SPEC-MCNC: 8.9 MG/DL (ref 8.4–10.2)
CHLORIDE SERPL-SCNC: 98 MMOL/L (ref 98–107)
CO2 SERPL-SCNC: 34 MMOL/L (ref 22–30)
GLUCOSE BLD-MCNC: 119 MG/DL (ref 70–110)
GLUCOSE BLD-MCNC: 61 MG/DL (ref 70–110)
GLUCOSE BLD-MCNC: 94 MG/DL (ref 70–110)
GLUCOSE SERPL-MCNC: 92 MG/DL (ref 74–99)
POTASSIUM SERPL-SCNC: 3.2 MMOL/L (ref 3.5–5.1)
SODIUM SERPL-SCNC: 139 MMOL/L (ref 137–145)

## 2022-12-16 RX ADMIN — IPRATROPIUM BROMIDE AND ALBUTEROL SULFATE SCH ML: .5; 3 SOLUTION RESPIRATORY (INHALATION) at 15:30

## 2022-12-16 RX ADMIN — INSULIN ASPART SCH UNIT: 100 INJECTION, SOLUTION INTRAVENOUS; SUBCUTANEOUS at 08:23

## 2022-12-16 RX ADMIN — IPRATROPIUM BROMIDE AND ALBUTEROL SULFATE SCH ML: .5; 3 SOLUTION RESPIRATORY (INHALATION) at 09:09

## 2022-12-16 RX ADMIN — APIXABAN SCH MG: 2.5 TABLET, FILM COATED ORAL at 08:23

## 2022-12-16 RX ADMIN — INSULIN ASPART SCH: 100 INJECTION, SOLUTION INTRAVENOUS; SUBCUTANEOUS at 06:16

## 2022-12-16 RX ADMIN — DAPAGLIFLOZIN SCH MG: 10 TABLET, FILM COATED ORAL at 08:23

## 2022-12-16 RX ADMIN — INSULIN ASPART SCH: 100 INJECTION, SOLUTION INTRAVENOUS; SUBCUTANEOUS at 11:39

## 2022-12-16 RX ADMIN — PIPERACILLIN AND TAZOBACTAM SCH MLS/HR: 3; .375 INJECTION, POWDER, FOR SOLUTION INTRAVENOUS at 08:23

## 2022-12-16 RX ADMIN — INSULIN ASPART SCH: 100 INJECTION, SOLUTION INTRAVENOUS; SUBCUTANEOUS at 11:38

## 2022-12-16 RX ADMIN — DOCUSATE SODIUM SCH MG: 100 CAPSULE, LIQUID FILLED ORAL at 08:23

## 2022-12-16 RX ADMIN — POTASSIUM CHLORIDE SCH MEQ: 20 TABLET, EXTENDED RELEASE ORAL at 12:11

## 2022-12-16 RX ADMIN — BUDESONIDE AND FORMOTEROL FUMARATE DIHYDRATE SCH PUFF: 80; 4.5 AEROSOL RESPIRATORY (INHALATION) at 09:09

## 2022-12-16 RX ADMIN — IPRATROPIUM BROMIDE AND ALBUTEROL SULFATE SCH ML: .5; 3 SOLUTION RESPIRATORY (INHALATION) at 12:43

## 2022-12-16 RX ADMIN — POTASSIUM CHLORIDE SCH MEQ: 20 TABLET, EXTENDED RELEASE ORAL at 11:22

## 2022-12-16 RX ADMIN — PANTOPRAZOLE SODIUM SCH MG: 40 TABLET, DELAYED RELEASE ORAL at 06:16

## 2022-12-16 RX ADMIN — CEFAZOLIN SCH MLS/HR: 330 INJECTION, POWDER, FOR SOLUTION INTRAMUSCULAR; INTRAVENOUS at 07:09

## 2022-12-16 NOTE — P.PN
Subjective


Progress Note Date: 12/16/22





PROGRESS NOTE


The patient is an 82-year-old female with known history of severe chronic 

obstructive lung disease, on home oxygen who stopped smoking about a year ago, 

moved from [place] recently who presented to the hospital with an episode of 

acute dyspnea.  She has chronic dyspnea on exertion much worse yesterday with 

symptoms of wheezing and cough.  She has paroxysmal atrial fibrillation and has 

been anticoagulated but she was in sinus mechanism on presentation.  She had no 

chest discomfort, peripheral edema, dizziness or syncope.  She has occasional 

palpitations.  She has no prior history of documented obstructive coronary 

artery disease.  She has no clear PND or orthopnea.  She used to  smoke heavily 

until last year.  Her coronary risk factors are positive for diabetes in the 

prior history of smoking.  She had no documented hypertension or hyperlipidemia 

in the past.  She seen in the emergency room on BiPAP, feeling better.  She was 

scheduled to be seen by Dr. Hennessy regarding her pulmonary status.  In the 

emergency room her troponin was 0.063 and her NT proBNP 2170.  She was in sinus 

mechanism.


December 15


The patient is feeling much better today, her breathing is better.  She denies 

any chest discomfort.  She is on Coumadin air.  She denies any dizziness or 

palpitation.  She denies any nausea or vomiting.  She continues to be in sinus 

mechanism and hemodynamically stable.


December 16:


The patient is feeling much better today, she denies any chest discomfort, 

dizziness or palpitations.  Her breathing is improved.  She continues to be in 

sinus mechanism.  Her echocardiogram showed a normal systolic function with 

severe pulmonary hypertension.  She has severe tricuspid regurgitation.





Medications:


Lasix 20 mg by mouth daily, insulin, Eliquis 10 mg qd, Farxiga 10 mg qd


PHYSICAL EXAMINATION:


Blood pressure 109/60 heart rate 90


LUNGS: Decrease air exchange bilaterally, much improved compared to yesterday


HEART: Regular rate and rhythm, S1, S2. No S3.  Holosystolic murmur


ABDOMEN: Soft, nontender, no organomegaly


EXTREMETIES: No edema





LAB:


Echo pending


IMPRESSION:


1.  Exacerbation of COPD


2.  Paroxysmal atrial fibrillation


3.  History of smoking


4.  History of diabetes


5.  Mild troponin elevation representing type II myocardial infarction


6.  Severe pulmonary hypertension





PLAN:


1.  Continue present therapy


2.  Probable discharge home today


3.  Follow-up as an outpatient





 








Objective





- Vital Signs


Vital signs: 


                                   Vital Signs











Temp  98.3 F   12/16/22 15:00


 


Pulse  92   12/16/22 15:39


 


Resp  18   12/16/22 15:39


 


BP  109/68   12/16/22 15:00


 


Pulse Ox  92 L  12/16/22 15:00


 


FiO2  40   12/14/22 15:30








                                 Intake & Output











 12/16/22 12/16/22 12/17/22





 06:59 18:59 06:59


 


Intake Total 690 358 


 


Balance 690 358 


 


Weight 60 kg  


 


Intake:   


 


  Oral 690 358 


 


Other:   


 


  Voiding Method Toilet Toilet 


 


  # Voids 3 2 


 


  # Bowel Movements  1 














- Labs


CBC & Chem 7: 


                                 12/13/22 22:17





                                 12/16/22 06:34


Labs: 


                  Abnormal Lab Results - Last 24 Hours (Table)











  12/15/22 12/16/22 12/16/22 Range/Units





  20:07 06:02 06:34 


 


Potassium    3.2 L  (3.5-5.1)  mmol/L


 


Carbon Dioxide    34 H  (22-30)  mmol/L


 


BUN    21 H  (7-17)  mg/dL


 


Creatinine    1.39 H  (0.52-1.04)  mg/dL


 


POC Glucose (mg/dL)  144 H  119 H   ()  mg/dL














  12/16/22 Range/Units





  11:30 


 


Potassium   (3.5-5.1)  mmol/L


 


Carbon Dioxide   (22-30)  mmol/L


 


BUN   (7-17)  mg/dL


 


Creatinine   (0.52-1.04)  mg/dL


 


POC Glucose (mg/dL)  61 L  ()  mg/dL








                      Microbiology - Last 24 Hours (Table)











 12/14/22 12:46 Blood Culture - Preliminary





 Blood    No Growth after 48 hours

## 2022-12-16 NOTE — CA
Transthoracic Echo Report 

 Name: Terrie Banegas 

 MRN:    [id number] 

 Age:    82     Gender:     F 

 

 :    1940 

 Exam Date:     2022 11:29 

 Exam Location: [facility] 

 Ht (in):     60     Wt (lb):     130 

 Ordering Physician:        Ace Hennessy MD 

 Attending/Referring Phys: 

 Technician         Corrie Jones RDCS 

 Procedure CPT: 

 Indications:       pulmnary hypertension 

 

 Cardiac Hx: 

 Technical Quality: 

 Contrast 1:                                Total Dose (mL): 

 Contrast 2:                                Total Dose (mL): 

 

 MEASUREMENTS  (Male / Female) Normal Values 

 2D ECHO 

 LV Diastolic Diameter PLAX        3.4 cm                4.2 - 5.9 / 3.9 - 5.3 cm 

 LV Systolic Diameter PLAX         2.5 cm                 

 IVS Diastolic Thickness           0.9 cm                0.6 - 1.0 / 0.6 - 0.9 cm 

 LVPW Diastolic Thickness          1.1 cm                0.6 - 1.0 / 0.6 - 0.9 cm 

 LV Relative Wall Thickness        0.6                    

 RV Internal Dim ED PLAX           2.8 cm                 

 LA Volume                         33.7 cm???              18 - 58 / 22 - 52 cm??? 

 

 M-MODE 

 Aortic Root Diameter MM           3.3 cm                 

 LA Systolic Diameter MM           3.6 cm                 

 LA Ao Ratio MM                    1.1                    

 AV Cusp Separation MM             1.3 cm                 

 

 DOPPLER 

 AV Peak Velocity                  130.5 cm/s             

 AV Peak Gradient                  6.8 mmHg               

 LVOT Peak Velocity                103.2 cm/s             

 LVOT Peak Gradient                4.3 mmHg               

 MV Area PHT                       2.9 cm???                

 Mitral E Point Velocity           84.2 cm/s              

 Mitral A Point Velocity           112.8 cm/s             

 Mitral E to A Ratio               0.7                    

 MV Deceleration Time              265.0 ms               

 MV E' Velocity                    4.6 cm/s               

 Mitral E to MV E' Ratio           18.1                   

 TR Peak Velocity                  409.0 cm/s             

 TR Peak Gradient                  66.9 mmHg              

 Right Ventricular Systolic Press  70.5 mmHg              

 

 

 FINDINGS 

 Left Ventricle 

 Mildly increased left ventricular wall thickness. Normal left ventricular  

 systolic function with no obvious regional wall motion abnormalities. Left  

 ventricular ejection fraction is estimated at 55 %. 

 

 Right Ventricle 

 Moderate right ventricular dilatation. Severe pulmonary hypertension. Right  

 ventricular systolic pressure estimated at 71 mm hg. 

 

 Right Atrium 

 Moderate right atrial dilatation. 

 

 Left Atrium 

 Normal left atrial size. 

 

 Mitral Valve 

 Mild mitral regurgitation. 

 

 Aortic Valve 

 No aortic valve stenosis or regurgitation. Aortic valve sclerosis. 

 

 Tricuspid Valve 

 Structurally normal tricuspid valve. Moderate-to-severe tricuspid  

 regurgitation. 

 

 Pulmonic Valve 

 Trace pulmonic regurgitation. 

 

 Pericardium 

 No pericardial effusion. 

 

 Aorta 

 Normal size aortic root and proximal ascending aorta. 

 

 CONCLUSIONS 

 Normal LV systolic function 

 Severe tricuspid regurgitation 

 Severe pulmonary hypertension 

 RV is dilated 

 Right atrium is enlarged 

 Previewed by:  

 Dr. Romero Dubois MD 

 (Electronically Signed) 

 Final Date:      2022 17:17

## 2022-12-16 NOTE — PN
PROGRESS NOTE



DATE OF SERVICE:  12/15/2022



SUBJECTIVE:

This is an 82-year-old woman who was admitted with COPD acute exacerbation with

bilateral pneumonia, is being closely monitored at this time.  The patient is feeling

much better.  No chest pain.  No palpitations.  Multiple consultants are following the

patient closely.



OBJECTIVE:

VITAL SIGNS: Pulse is 78, blood pressure 128/63, respirations 18.

CHEST:  Bilateral scattered rhonchi and crackles.

ABDOMEN:  Soft.

NERVOUS SYSTEM:  No focal deficits.



LABORATORY DATA:

Reviewed.



ASSESSMENT:

1. Chronic obstructive pulmonary disease acute exacerbation with possible bilateral

    pneumonia with possibly gram-negative.

2. Troponin 0.063.  Rule out acute non-ST-segment-elevation myocardial infarction,

    possibly indicating type 2 myocardial infarction.

3. Hyponatremia.

4. Increased WBC.



RECOMMENDATIONS:

Recommend to continue current medical management and continue symptomatic treatment and

closely follow with Cardiology and Pulmonology.  Continue with antibiotics and rest of

medications.  Prognosis guarded.  Further recommendations to follow. See orders for

further details.





MMODL / IJN: [id number] / Job#: [id number]

## 2022-12-16 NOTE — P.PN
Subjective


Progress Note Date: 12/16/22


Principal diagnosis: 


Pneumonia





Patient is a 82-year-old  female with a past medical history 

significant for COPD who has recently moved from [place] to be with her family 

is presenting to the ER for evaluation of increasing shortness of breath, 

patient did have elevated white count and chest x-ray was interstitial 

infiltrate.


On today's evaluation that is 12/16/2022, the patient remains to be afebrile, 

the patient is breathing  comfortably on 6 L nasal cannula with the patient is 

currently at home as well on 6 L, the patient denies having any chest pain no 

worsening cough or sputum production no abdominal pain no diarrhea








Objective





- Vital Signs


Vital signs: 


                                   Vital Signs











Temp  98.3 F   12/16/22 08:21


 


Pulse  85   12/16/22 09:21


 


Resp  20   12/16/22 08:21


 


BP  98/60   12/16/22 08:21


 


Pulse Ox  93 L  12/16/22 08:21


 


FiO2  40   12/14/22 15:30








                                 Intake & Output











 12/15/22 12/16/22 12/16/22





 18:59 06:59 18:59


 


Intake Total 240 690 358


 


Output Total 300  


 


Balance -60 690 358


 


Weight 58.967 kg 60 kg 


 


Intake:   


 


  Oral 240 690 358


 


Output:   


 


  Urine 300  


 


Other:   


 


  Voiding Method Toilet Toilet Toilet


 


  # Voids 3 3 2


 


  # Bowel Movements   1














- Exam


GENERAL DESCRIPTION: An elderly female lying in bed in no distress





RESPIRATORY SYSTEM: Unlabored breathing , decreased breath sounds at bases





HEART: S1 S2 regular rate and rhythm ,





ABDOMEN: Soft , no tenderness





EXTREMITIES: No edema feet








- Labs


CBC & Chem 7: 


                                 12/13/22 22:17





                                 12/16/22 06:34


Labs: 


                  Abnormal Lab Results - Last 24 Hours (Table)











  12/15/22 12/15/22 12/15/22 Range/Units





  11:43 16:25 20:07 


 


Potassium     (3.5-5.1)  mmol/L


 


Carbon Dioxide     (22-30)  mmol/L


 


BUN     (7-17)  mg/dL


 


Creatinine     (0.52-1.04)  mg/dL


 


POC Glucose (mg/dL)  189 H  171 H  144 H  ()  mg/dL














  12/16/22 12/16/22 Range/Units





  06:02 06:34 


 


Potassium   3.2 L  (3.5-5.1)  mmol/L


 


Carbon Dioxide   34 H  (22-30)  mmol/L


 


BUN   21 H  (7-17)  mg/dL


 


Creatinine   1.39 H  (0.52-1.04)  mg/dL


 


POC Glucose (mg/dL)  119 H   ()  mg/dL








                      Microbiology - Last 24 Hours (Table)











 12/14/22 12:46 Blood Culture - Preliminary





 Blood    No Growth after 24 hours














Assessment and Plan


(1) Pneumonia


Status: Acute   Code(s): J18.9 - PNEUMONIA, UNSPECIFIED ORGANISM   SNOMED 

Code(s): 167697166


   


Plan: 


1patient presented to hospital with increasing shortness of breath and cough 

cough in this patient with underlying COPD patient did have elevated white count

and interstitial infiltrate with a negative COVID RSV influenza concerning for 

possible pneumonia with COPD exacerbation likely etiology of her symptoms.


2sputum were not collected blood culture had been negative so far


3patient has shown clinical improvement and well  continue with Zosyn with a 

plan to finish therapy with oral antibiotics


Time with Patient: Less than 30

## 2022-12-16 NOTE — P.PN
Subjective


Progress Note Date: 12/16/22


Principal diagnosis: 





Acute on chronic hypoxic respiratory failure





Mixed bacterial pneumonia community-acquired versus gram-negative





Sepsis





Hyperglycemia and uncontrolled diabetes mellitus with hyperglycemia





Chronic kidney disease





Mild CHF likely acute on chronic diastolic heart failure with pulmonary 

hypertension group 3


12/16/2022, patient seen and evaluated examined during the rounds labs reviewed 

medications reviewed still short of breath on activity and exertion overall 

doing better still on supplemental oxygen which is being titrated down to 6 L 

nasal cannula, hemodynamically stable, remains afebrile, blood pressure 120/73 

heart rate 84, labs reviewed BUN/creatinine 21/1.39 potassium is 3.2, oxygen 

saturation is 91-90% on 6 L nasal cannula, patient getting potassium replacement

protocol protocol, patient remains on the bronchodilator direct oral 

anticoagulants and Zosyn which can be switched to oral at the time of discharge 

to Augmentin





82-year-old female seen eval reexamined during the rounds patient has baseline 

problems COPD recently moved from [place] she was seen in the office recently 

with oxygen saturation in the 70s surprisingly awake and alert with mild 

shortness of breath, at that time patient was given option for admission and 

evaluation she declined would agree to take antibiotics and steroids, she has 

been using 6 L nasal cannula oxygen at home her oxygen saturation on 6 L drop 

down to 63% decided to come into the hospital she is on breathing treatment 4 

times a day get short of breath when lies flat denies any chest pain or radia

tion of pain no history of blood clot no history of leg swelling denies any loss

of consciousness) she has seizure like activity, denies any chest pain, patient 

was initially treated with BiPAP 10/5 with 40% oxygen and admitted into the 

hospital.  Her white cell count is significant for 16.2, hemoglobin hematocrit 

is stable 11.6/36, BUN/creatinine 31/1.3 sodium is 135.  AST/ALT mildly elevated

55/49, lactic acid was elevated at 9.4, troponins were 0.063, BNP is 2001 and 70

her initial chest x-ray suggestive of interstitial infiltrate and atelectasis at

the bases.  Patient is being continued on bronchodilators along with oral direct

oral anticoagulants, has been on statins as well with broad-spectrum antibiotics

along with Zosyn d-dimer is normal 0.230 calcitonin not done, influenza A and B 

RSV and covert has been negative, lactic acid came down to normal range of 2 

within 24 hrs.








Objective





- Vital Signs


Vital signs: 


                                   Vital Signs











Temp  98.3 F   12/16/22 08:21


 


Pulse  85   12/16/22 13:13


 


Resp  20   12/16/22 11:21


 


BP  120/73   12/16/22 11:21


 


Pulse Ox  91 L  12/16/22 11:21


 


FiO2  40   12/14/22 15:30








                                 Intake & Output











 12/15/22 12/16/22 12/16/22





 18:59 06:59 18:59


 


Intake Total 240 690 358


 


Output Total 300  


 


Balance -60 690 358


 


Weight 58.967 kg 60 kg 


 


Intake:   


 


  Oral 240 690 358


 


Output:   


 


  Urine 300  


 


Other:   


 


  Voiding Method Toilet Toilet Toilet


 


  # Voids 3 3 2


 


  # Bowel Movements   1














- Exam





- Constitutional


General appearance: average body habitus, cooperative, disheveled, mild distress





- EENT


Eyes: EOMI, PERRLA


Ears: bilateral: normal





- Neck


Neck: normal ROM


Carotids: bilateral: upstroke normal


Thyroid: bilateral: normal size





- Respiratory


Respiratory: bilateral: diminished





- Cardiovascular


Rhythm: regular


Heart sounds: normal: S1, S2





- Gastrointestinal


General gastrointestinal: soft





- Integumentary


Integumentary: normal turgor





- Neurologic


Neurologic: CNII-XII intact





- Musculoskeletal


Musculoskeletal: gait normal, generalized weakness, strength equal bilaterally





- Psychiatric


Psychiatric: A&O x's 3, appropriate affect, intact judgment & insight





- Labs


CBC & Chem 7: 


                                 12/13/22 22:17





                                 12/16/22 06:34


Labs: 


                  Abnormal Lab Results - Last 24 Hours (Table)











  12/15/22 12/15/22 12/16/22 Range/Units





  16:25 20:07 06:02 


 


Potassium     (3.5-5.1)  mmol/L


 


Carbon Dioxide     (22-30)  mmol/L


 


BUN     (7-17)  mg/dL


 


Creatinine     (0.52-1.04)  mg/dL


 


POC Glucose (mg/dL)  171 H  144 H  119 H  ()  mg/dL














  12/16/22 12/16/22 Range/Units





  06:34 11:30 


 


Potassium  3.2 L   (3.5-5.1)  mmol/L


 


Carbon Dioxide  34 H   (22-30)  mmol/L


 


BUN  21 H   (7-17)  mg/dL


 


Creatinine  1.39 H   (0.52-1.04)  mg/dL


 


POC Glucose (mg/dL)   61 L  ()  mg/dL








                      Microbiology - Last 24 Hours (Table)











 12/14/22 12:46 Blood Culture - Preliminary





 Blood    No Growth after 24 hours














Assessment and Plan


Assessment: 





Acute on chronic hypoxic respiratory failure





Mixed bacterial pneumonia community-acquired versus gram-negative





Sepsis





Hyperglycemia and uncontrolled diabetes mellitus with hyperglycemia





Chronic kidney disease





Mild CHF likely acute on chronic diastolic heart failure with pulmonary 

hypertension group 3


Plan: 





Broad-spectrum antibiotics, can be switched to oral like Augmentin at the time 

of discharge





Gentle diuresis





Bronchodilator





Continue supplemental oxygen during the day and BiPAP support each night and 

when necessary during day





Further plan of care as per clinical response of the patient


Time with Patient: Greater than 30

## 2022-12-17 NOTE — P.DS
Providers


Date of admission: 


12/13/22 23:58





Expected date of discharge: 12/16/22


Attending physician: 


Russell Qureshi





Consults: 





                                        





12/13/22 23:56


Consult Physician Routine 


   Consulting Provider: Ace Hennessy


   Consult Reason/Comments: hypoxia


   Do you want consulting provider notified?: Yes


Consult Physician Routine 


   Consulting Provider: Les Duncan


   Consult Reason/Comments: heart failure


   Do you want consulting provider notified?: Yes





12/14/22 09:40


Consult Physician Urgent 


   Consulting Provider: Sparkle Calero


   Consult Reason/Comments: pneumonia?


   Do you want consulting provider notified?: Yes











Primary care physician: 


Jairo Vail





Hospital Course: 











Final diagnosis





Chronic obstructive pulmonary disease with acute exacerbation with possible 

bilateral pneumonia, gram-negative


Troponin 0.063 ruled out acute NSTEMI, most likely type II myocardial infarction


Hyponatremia


Increased WBC


No code











Discharge disposition


Patient is being discharged in a stable condition with guarded prognosis to home

with home care.  Patient will follow-up with Dr. Vail  in the outpatient 

setting upon discharge.  Patient is to continue with oral Augmentin twice daily 

for the next 5 days along with prednisone as scheduled.  Patient will need 

outpatient follow-up with pulmonary along with cardiology.  Total time taken is 

greater than 35 minutes.





Hospital course


This is a 82-year-old female who was recently admitted with increasing shortness

of breath and COPD exacerbation with concerns for bilateral pneumonia being 

closely monitored with pulmonary and cardiology following.  Patient was 

initially started on BiPAP and has transitioned to 5 L.  Family reports she does

wear 5-6 L chronically in the outpatient setting.  Patient does use inhalers 

along with nebulized treatments and will continue.  Patient will continue on 

oral Augmentin showing clinical improvement with Zosyn with pulmonary following 

recommending close outpatient follow-up and also prednisone on discharge.  

Patient needs close outpatient follow-up with cardiology as well.


Currently no reports of chest pain, shortness of breath, or palpitations.  

Patient is afebrile.  No reports of nausea or vomiting and patient is tolerating

diet.  Patient will be charged home in guarded prognosis as patient is high risk

for readmissions.  Multiple comorbidities noted.





Physical exam:








Gen: This is a 82-year-old female who is awake, alert and oriented 3, thin 

built, elderly appearing female


HEENT: Head is atraumatic, normocephalic. Pupils equal, round. Sclerae is 

anicteric. 


NECK: Supple. No JVD. No lymphadenopathy. No thyromegaly. 


LUNGS: Diminished breath sounds bilaterally with some scattered rhonchi noted  

No intercostal retractions.


HEART: S1, S2 are muffled


ABDOMEN: Soft. Bowel sounds are present. No masses.  No tenderness.


EXTREMITIES: No pedal edema.  No calf tenderness.


NEUROLOGICAL: Patient is awake, alert and oriented x3. Cranial nerves 2 through 

12 are grossly intact. 





Please refer to medication reconciliation sheet for a list of medications.





The impression and plan of care has been dictated by Maggie Encarnacion, Nurse 

Practitioner as directed.





Dr. Titus MD


I have performed a history and examination and MDM of this patient, discussed 

the same with the dictator, and  agree with the dictator's assessment and plan 

as written ,documented as a scribe. Based on total visit time,  I have performed

more than 50% of the visit.  


Patient Condition at Discharge: Fair





Plan - Discharge Summary


New Discharge Prescriptions: 


New


   Amoxic-Pot Clav 875-125Mg [Augmentin 875-125] 1 tab PO Q12HR 5 Days #10 tab


   Ipratropium-Albuterol Nebulize [Duoneb 0.5 mg-3 mg/3 ml Soln] 3 ml INHALATION

RT-QID PRN  each


     PRN Reason: Shortness Of Breath Or Wheezing


   Atorvastatin [Lipitor] 40 mg PO DAILY 30 Days #30 tab


   Pantoprazole [Protonix] 40 mg PO AC-BRKFST 30 Days #30 tab


   Acetaminophen Tab [Tylenol] 650 mg PO Q6HR PRN  tab


     PRN Reason: Mild Pain Or Fever > 100.5


   Docusate [Colace] 100 mg PO BID 30 Days #60 cap


   Ipratropium-Albuterol Nebulize [Duoneb 0.5 mg-3 mg/3 ml Soln] 3 ml INHALATION

RT-QID  each


   Furosemide [Lasix] 20 mg PO DAILY 30 Days #30 tab





Continue


   Ipratropium/Albuter 20-100Mcg [Combivent Respimat 20-100Mcg Inhaler] 1 puff 

INHALATION RT-QID


   metFORMIN HCL [Glucophage] 1,000 mg PO BID


   Furosemide [Lasix] 40 mg PO AS DIRECTED


   Dapagliflozin Propanediol [Farxiga] 10 mg PO DAILY


   predniSONE See Taper PO AS DIRECTED


   Fluticasone Propion/Salmeterol [Advair 250-50 Diskus] 1 puff INHALATION RT-

BID


   Apixaban [Eliquis] 2.5 mg PO BID


   Cyanocobalamin [Vitamin B-12 Injection] 1,000 mcg SQ QMONTHLY


   Semaglutide [Ozempic] 0.5 mg SQ TH





Discontinued


   Ipratropium-Albuterol Nebulize [Duoneb 0.5 mg-3 mg/3 ml Soln] 3 ml INHALATION

AS DIRECTED


Discharge Medication List





Apixaban [Eliquis] 2.5 mg PO BID 12/14/22 [History]


Cyanocobalamin [Vitamin B-12 Injection] 1,000 mcg SQ QMONTHLY 12/14/22 [History]


Dapagliflozin Propanediol [Farxiga] 10 mg PO DAILY 12/14/22 [History]


Fluticasone Propion/Salmeterol [Advair 250-50 Diskus] 1 puff INHALATION RT-BID 

12/14/22 [History]


Furosemide [Lasix] 40 mg PO AS DIRECTED 12/14/22 [History]


Ipratropium/Albuter 20-100Mcg [Combivent Respimat 20-100Mcg Inhaler] 1 puff 

INHALATION RT-QID 12/14/22 [History]


Semaglutide [Ozempic] 0.5 mg SQ TH 12/14/22 [History]


metFORMIN HCL [Glucophage] 1,000 mg PO BID 12/14/22 [History]


predniSONE See Taper PO AS DIRECTED 12/14/22 [History]


Acetaminophen Tab [Tylenol] 650 mg PO Q6HR PRN  tab 12/16/22 [Rx]


Amoxic-Pot Clav 875-125Mg [Augmentin 875-125] 1 tab PO Q12HR 5 Days #10 tab 

12/16/22 [Rx]


Atorvastatin [Lipitor] 40 mg PO DAILY 30 Days #30 tab 12/16/22 [Rx]


Docusate [Colace] 100 mg PO BID 30 Days #60 cap 12/16/22 [Rx]


Furosemide [Lasix] 20 mg PO DAILY 30 Days #30 tab 12/16/22 [Rx]


Ipratropium-Albuterol Nebulize [Duoneb 0.5 mg-3 mg/3 ml Soln] 3 ml INHALATION 

RT-QID  each 12/16/22 [Rx]


Ipratropium-Albuterol Nebulize [Duoneb 0.5 mg-3 mg/3 ml Soln] 3 ml INHALATION 

RT-QID PRN  each 12/16/22 [Rx]


Pantoprazole [Protonix] 40 mg PO AC-BRKFST 30 Days #30 tab 12/16/22 [Rx]








Follow up Appointment(s)/Referral(s): 


Jairo Vail MD [Primary Care Provider] - 1 Week (Please schedule follow up 

appointment ASAP (primary care).)


[facility], [NON-STAFF] - 


Ace Hennessy MD [STAFF PHYSICIAN] - 1 Week (Please schedule follow up appointment 

ASAP (pulmonary care).)


Ambulatory/Diagnostic Orders: 


Basic Metabolic Panel [LAB.AMB] Time Frame: 3 Days, Location: None Selected


Patient Instructions/Handouts:  COPD (Chronic Obstructive Pulmonary Disease) 

(DC), Pneumonia (DC)


Activity/Diet/Wound Care/Special Instructions: 


Activity Limited until follow-up


Follow-up with primary care provider on discharge


follow-up with pulmonary outpatient in 1-2 weeks


Continue taking medications as prescribed


Recommend repeat labs in the next 2-3 days


Continue heart healthy consistent carb diet


Monitor your blood sugars closely





Discharge Disposition: HOME WITH HOME HEALTH SERVICES

## 2022-12-19 NOTE — CDI
Documentation Clarification Form



Date: 12/19/22

From: Morena Villarreal

MRN: [id number]

Admit Date: 12/13/2022 11:58:00 PM

Patient Name: Terrie Banegas

Visit Number: [id number]

Discharge Date:  12/16/2022 04:09:00 PM





ATTENTION: The Clinical Documentation Specialists (CDI) and [facility] Coding Staff 
appreciate your assistance in clarifying documentation. Please respond to the 
clarification below the line at the bottom and electronically sign. The CDI & 
[facility] Coding staff will review the response and follow-up if needed. Please note: 
Queries are made part of the Legal Health Record. If you have any questions, 
please contact the author of this message via ITS.



Dr. Russell Qureshi,



Conflicting documentation has been found in the medical record.  As attending 
physician, please provide clarification.



Per ED Note "Suspect that symptoms are secondary to obstructive versus 
restrictive lung process. Patient currently with antibiotics. We will withhold 
any aggressive fluid hydration at this time given that heart failure is part of 
the differential and more fluids may be harmful. Lactic acidosis is likely 
secondary to hypoxia as opposed to sepsis.

Per Dr Hennessy's consult and in his PN "Sepsis".



History/Risk Factors:  gram-neg pneumonia, T2MI, acute and chronic hypoxic 
respiratory failure, acute on chronic diastolic CHF, acidosis, hyponatremia, 
COPD w lower resp infection and in acute exacerbation, PAF, T2DM CKD & 
hyperglycemia



Clinical Indicators:  WBC 16.2, Neutrophils 13.3, Lactic acid 9.4

Vital signs:  T 97.7, P 128, R 38, /76, O2 75/80



Treatment:  BIPAP, Zosyn IV dc'c 12/14, IV fluids 12/13



Please clarify which diagnosis is most appropriate:



[x  ]  Sepsis, ruled in and POA

[  ]  Sepsis, ruled out

[  ]  Other (please specify) __________

[  ]  Unable to determine 

___________________________________________________________________________

due to suspected gram negative pneumonia

MTDD

## 2023-01-13 ENCOUNTER — HOSPITAL ENCOUNTER (INPATIENT)
Dept: HOSPITAL 47 - EC | Age: 83
LOS: 5 days | Discharge: HOSPICE | DRG: 291 | End: 2023-01-18
Attending: HOSPITALIST | Admitting: HOSPITALIST
Payer: MEDICARE

## 2023-01-13 VITALS — BODY MASS INDEX: 25.7 KG/M2

## 2023-01-13 DIAGNOSIS — N17.9: ICD-10-CM

## 2023-01-13 DIAGNOSIS — E11.649: ICD-10-CM

## 2023-01-13 DIAGNOSIS — Z79.51: ICD-10-CM

## 2023-01-13 DIAGNOSIS — Z99.81: ICD-10-CM

## 2023-01-13 DIAGNOSIS — Z20.822: ICD-10-CM

## 2023-01-13 DIAGNOSIS — Z79.899: ICD-10-CM

## 2023-01-13 DIAGNOSIS — J98.11: ICD-10-CM

## 2023-01-13 DIAGNOSIS — R77.8: ICD-10-CM

## 2023-01-13 DIAGNOSIS — Z79.01: ICD-10-CM

## 2023-01-13 DIAGNOSIS — I27.20: ICD-10-CM

## 2023-01-13 DIAGNOSIS — Z91.14: ICD-10-CM

## 2023-01-13 DIAGNOSIS — Z66: ICD-10-CM

## 2023-01-13 DIAGNOSIS — K59.00: ICD-10-CM

## 2023-01-13 DIAGNOSIS — I08.1: ICD-10-CM

## 2023-01-13 DIAGNOSIS — I48.19: ICD-10-CM

## 2023-01-13 DIAGNOSIS — I50.33: ICD-10-CM

## 2023-01-13 DIAGNOSIS — J44.1: ICD-10-CM

## 2023-01-13 DIAGNOSIS — J96.21: ICD-10-CM

## 2023-01-13 DIAGNOSIS — N39.0: ICD-10-CM

## 2023-01-13 DIAGNOSIS — D50.9: ICD-10-CM

## 2023-01-13 DIAGNOSIS — D63.1: ICD-10-CM

## 2023-01-13 DIAGNOSIS — E87.20: ICD-10-CM

## 2023-01-13 DIAGNOSIS — Z87.891: ICD-10-CM

## 2023-01-13 DIAGNOSIS — Z51.5: ICD-10-CM

## 2023-01-13 DIAGNOSIS — E11.22: ICD-10-CM

## 2023-01-13 DIAGNOSIS — Z79.84: ICD-10-CM

## 2023-01-13 DIAGNOSIS — T38.3X6A: ICD-10-CM

## 2023-01-13 DIAGNOSIS — E86.0: ICD-10-CM

## 2023-01-13 DIAGNOSIS — E11.65: ICD-10-CM

## 2023-01-13 DIAGNOSIS — I13.0: Primary | ICD-10-CM

## 2023-01-13 DIAGNOSIS — N18.30: ICD-10-CM

## 2023-01-13 LAB
ALBUMIN SERPL-MCNC: 4.4 G/DL (ref 3.5–5)
ALP SERPL-CCNC: 102 U/L (ref 38–126)
ALT SERPL-CCNC: 60 U/L (ref 4–34)
ANION GAP SERPL CALC-SCNC: 21 MMOL/L
APTT BLD: 21 SEC (ref 22–30)
AST SERPL-CCNC: 65 U/L (ref 14–36)
BASOPHILS # BLD AUTO: 0.1 K/UL (ref 0–0.2)
BASOPHILS NFR BLD AUTO: 1 %
BUN SERPL-SCNC: 15 MG/DL (ref 7–17)
CALCIUM SPEC-MCNC: 9 MG/DL (ref 8.4–10.2)
CHLORIDE SERPL-SCNC: 97 MMOL/L (ref 98–107)
CO2 BLDA-SCNC: 23 MMOL/L (ref 19–24)
CO2 SERPL-SCNC: 17 MMOL/L (ref 22–30)
EOSINOPHIL # BLD AUTO: 0.3 K/UL (ref 0–0.7)
EOSINOPHIL NFR BLD AUTO: 2 %
ERYTHROCYTE [DISTWIDTH] IN BLOOD BY AUTOMATED COUNT: 3.48 M/UL (ref 3.8–5.4)
ERYTHROCYTE [DISTWIDTH] IN BLOOD: 15.1 % (ref 11.5–15.5)
GLUCOSE BLD-MCNC: 222 MG/DL (ref 70–110)
GLUCOSE BLD-MCNC: 302 MG/DL (ref 70–110)
GLUCOSE BLD-MCNC: 320 MG/DL (ref 70–110)
GLUCOSE BLD-MCNC: 458 MG/DL (ref 70–110)
GLUCOSE SERPL-MCNC: 576 MG/DL (ref 74–99)
GLUCOSE UR QL: (no result)
HCO3 BLDA-SCNC: 22 MMOL/L (ref 21–25)
HCT VFR BLD AUTO: 36.1 % (ref 34–46)
HGB BLD-MCNC: 11.2 GM/DL (ref 11.4–16)
INR PPP: 1 (ref ?–1.2)
LYMPHOCYTES # SPEC AUTO: 2.1 K/UL (ref 1–4.8)
LYMPHOCYTES NFR SPEC AUTO: 16 %
MAGNESIUM SPEC-SCNC: 1.5 MG/DL (ref 1.6–2.3)
MCH RBC QN AUTO: 32.3 PG (ref 25–35)
MCHC RBC AUTO-ENTMCNC: 31.1 G/DL (ref 31–37)
MCV RBC AUTO: 103.8 FL (ref 80–100)
MONOCYTES # BLD AUTO: 0.6 K/UL (ref 0–1)
MONOCYTES NFR BLD AUTO: 4 %
NEUTROPHILS # BLD AUTO: 10.1 K/UL (ref 1.3–7.7)
NEUTROPHILS NFR BLD AUTO: 76 %
PCO2 BLDA: 32 MMHG (ref 35–45)
PH BLDA: 7.44 [PH] (ref 7.35–7.45)
PH UR: 5 [PH] (ref 5–8)
PLATELET # BLD AUTO: 400 K/UL (ref 150–450)
PO2 BLDA: 269 MMHG (ref 83–108)
POTASSIUM SERPL-SCNC: 4.9 MMOL/L (ref 3.5–5.1)
PROT SERPL-MCNC: 7.5 G/DL (ref 6.3–8.2)
PT BLD: 10.4 SEC (ref 9–12)
RBC UR QL: 5 /HPF (ref 0–5)
SODIUM SERPL-SCNC: 135 MMOL/L (ref 137–145)
SP GR UR: 1.02 (ref 1–1.03)
SQUAMOUS UR QL AUTO: 3 /HPF (ref 0–4)
UROBILINOGEN UR QL STRIP: <2 MG/DL (ref ?–2)
WBC # BLD AUTO: 13.4 K/UL (ref 3.8–10.6)
WBC # UR AUTO: 59 /HPF (ref 0–5)

## 2023-01-13 PROCEDURE — 93306 TTE W/DOPPLER COMPLETE: CPT

## 2023-01-13 PROCEDURE — 82805 BLOOD GASES W/O2 SATURATION: CPT

## 2023-01-13 PROCEDURE — 83540 ASSAY OF IRON: CPT

## 2023-01-13 PROCEDURE — 96368 THER/DIAG CONCURRENT INF: CPT

## 2023-01-13 PROCEDURE — 94660 CPAP INITIATION&MGMT: CPT

## 2023-01-13 PROCEDURE — 83880 ASSAY OF NATRIURETIC PEPTIDE: CPT

## 2023-01-13 PROCEDURE — 36600 WITHDRAWAL OF ARTERIAL BLOOD: CPT

## 2023-01-13 PROCEDURE — 94640 AIRWAY INHALATION TREATMENT: CPT

## 2023-01-13 PROCEDURE — 85610 PROTHROMBIN TIME: CPT

## 2023-01-13 PROCEDURE — 36415 COLL VENOUS BLD VENIPUNCTURE: CPT

## 2023-01-13 PROCEDURE — 80053 COMPREHEN METABOLIC PANEL: CPT

## 2023-01-13 PROCEDURE — 96366 THER/PROPH/DIAG IV INF ADDON: CPT

## 2023-01-13 PROCEDURE — 80048 BASIC METABOLIC PNL TOTAL CA: CPT

## 2023-01-13 PROCEDURE — 93005 ELECTROCARDIOGRAM TRACING: CPT

## 2023-01-13 PROCEDURE — 81001 URINALYSIS AUTO W/SCOPE: CPT

## 2023-01-13 PROCEDURE — 87086 URINE CULTURE/COLONY COUNT: CPT

## 2023-01-13 PROCEDURE — 87635 SARS-COV-2 COVID-19 AMP PRB: CPT

## 2023-01-13 PROCEDURE — 71045 X-RAY EXAM CHEST 1 VIEW: CPT

## 2023-01-13 PROCEDURE — 94760 N-INVAS EAR/PLS OXIMETRY 1: CPT

## 2023-01-13 PROCEDURE — 5A09357 ASSISTANCE WITH RESPIRATORY VENTILATION, LESS THAN 24 CONSECUTIVE HOURS, CONTINUOUS POSITIVE AIRWAY PRESSURE: ICD-10-PCS

## 2023-01-13 PROCEDURE — 96375 TX/PRO/DX INJ NEW DRUG ADDON: CPT

## 2023-01-13 PROCEDURE — 84484 ASSAY OF TROPONIN QUANT: CPT

## 2023-01-13 PROCEDURE — 85025 COMPLETE CBC W/AUTO DIFF WBC: CPT

## 2023-01-13 PROCEDURE — 83735 ASSAY OF MAGNESIUM: CPT

## 2023-01-13 PROCEDURE — 83605 ASSAY OF LACTIC ACID: CPT

## 2023-01-13 PROCEDURE — 83036 HEMOGLOBIN GLYCOSYLATED A1C: CPT

## 2023-01-13 PROCEDURE — 84145 PROCALCITONIN (PCT): CPT

## 2023-01-13 PROCEDURE — 85730 THROMBOPLASTIN TIME PARTIAL: CPT

## 2023-01-13 PROCEDURE — 83550 IRON BINDING TEST: CPT

## 2023-01-13 PROCEDURE — 85027 COMPLETE CBC AUTOMATED: CPT

## 2023-01-13 PROCEDURE — 87502 INFLUENZA DNA AMP PROBE: CPT

## 2023-01-13 PROCEDURE — 96365 THER/PROPH/DIAG IV INF INIT: CPT

## 2023-01-13 PROCEDURE — 99291 CRITICAL CARE FIRST HOUR: CPT

## 2023-01-13 RX ADMIN — IPRATROPIUM BROMIDE AND ALBUTEROL SULFATE SCH: .5; 3 SOLUTION RESPIRATORY (INHALATION) at 11:23

## 2023-01-13 RX ADMIN — DILTIAZEM HYDROCHLORIDE SCH MLS/HR: 5 INJECTION INTRAVENOUS at 07:52

## 2023-01-13 RX ADMIN — INSULIN ASPART SCH UNIT: 100 INJECTION, SOLUTION INTRAVENOUS; SUBCUTANEOUS at 16:44

## 2023-01-13 RX ADMIN — FUROSEMIDE SCH MG: 10 INJECTION, SOLUTION INTRAMUSCULAR; INTRAVENOUS at 17:39

## 2023-01-13 RX ADMIN — INSULIN ASPART SCH UNIT: 100 INJECTION, SOLUTION INTRAVENOUS; SUBCUTANEOUS at 12:51

## 2023-01-13 RX ADMIN — ACETAMINOPHEN PRN MG: 325 TABLET, FILM COATED ORAL at 23:02

## 2023-01-13 RX ADMIN — ACETAMINOPHEN PRN MG: 325 TABLET, FILM COATED ORAL at 15:24

## 2023-01-13 RX ADMIN — IPRATROPIUM BROMIDE AND ALBUTEROL SULFATE SCH ML: .5; 3 SOLUTION RESPIRATORY (INHALATION) at 20:32

## 2023-01-13 RX ADMIN — METHYLPREDNISOLONE SODIUM SUCCINATE SCH MG: 40 INJECTION, POWDER, FOR SOLUTION INTRAMUSCULAR; INTRAVENOUS at 22:59

## 2023-01-13 RX ADMIN — DILTIAZEM HYDROCHLORIDE SCH MLS/HR: 5 INJECTION INTRAVENOUS at 22:59

## 2023-01-13 RX ADMIN — APIXABAN SCH MG: 2.5 TABLET, FILM COATED ORAL at 20:15

## 2023-01-13 RX ADMIN — IPRATROPIUM BROMIDE AND ALBUTEROL SULFATE SCH ML: .5; 3 SOLUTION RESPIRATORY (INHALATION) at 16:01

## 2023-01-13 RX ADMIN — INSULIN ASPART SCH UNIT: 100 INJECTION, SOLUTION INTRAVENOUS; SUBCUTANEOUS at 20:15

## 2023-01-13 RX ADMIN — BUDESONIDE AND FORMOTEROL FUMARATE DIHYDRATE SCH PUFF: 80; 4.5 AEROSOL RESPIRATORY (INHALATION) at 20:32

## 2023-01-13 RX ADMIN — DOCUSATE SODIUM SCH MG: 100 CAPSULE, LIQUID FILLED ORAL at 20:15

## 2023-01-13 RX ADMIN — METHYLPREDNISOLONE SODIUM SUCCINATE SCH MG: 40 INJECTION, POWDER, FOR SOLUTION INTRAMUSCULAR; INTRAVENOUS at 16:14

## 2023-01-13 RX ADMIN — INSULIN DETEMIR SCH UNIT: 100 INJECTION, SOLUTION SUBCUTANEOUS at 16:45

## 2023-01-13 NOTE — ED
SOB HPI





- General


Chief Complaint: Shortness of Breath


Stated Complaint: LUIS A


Time Seen by Provider: 01/13/23 07:18


Source: patient


Mode of arrival: ambulatory


Limitations: no limitations





- History of Present Illness


Initial Comments: 





82-year-old female presents to the emergency department with shortness of 

breath.  She does have history of COPD and wears 6 L of home O2.  States that 

she has been extremely short of breath all night.  She does arrive in triage and

oxygen saturations are 75% on her 6 L.  She denies a cough.  No chest pain.  No 

lower extremity edema.  Has not been taking her medications as directed 

including her diabetic medications.  She has not been checking her blood sugars.

 Unsure of who her pulmonologist is and does not check her oxygen at baseline.  

Granddaughter is at bedside and helps to provide some history.  Patient found to

be in A. fib with a rapid rate.  Daughter states the patient is on 

anticoagulation as she does have a history of A. fib.  No history of DVT or PE. 

No calf pain or swelling.  No other alleviating, precipitator modifying factors





- Related Data


                                Home Medications











 Medication  Instructions  Recorded  Confirmed


 


Apixaban [Eliquis] 2.5 mg PO BID 12/14/22 01/13/23


 


Cyanocobalamin [Vitamin B-12 1,000 mcg SQ Q30D 12/14/22 01/13/23





Injection]   


 


Dapagliflozin Propanediol [Farxiga] 10 mg PO DAILY 12/14/22 01/13/23


 


Fluticasone Propion/Salmeterol 1 puff INHALATION RT-BID 12/14/22 01/13/23





[Advair 250-50 Diskus]   


 


Ipratropium/Albuter 20-100Mcg 1 puff INHALATION RT-QID 12/14/22 01/13/23





[Combivent Respimat 20-100Mcg   





Inhaler]   


 


Semaglutide [Ozempic] 0.5 mg SQ TH 12/14/22 01/13/23


 


Potassium Chloride ER [K-Dur 10] 10 meq PO DAILY 01/13/23 01/13/23


 


metFORMIN HCL 1,000 mg PO BID 01/13/23 01/13/23








                                  Previous Rx's











 Medication  Instructions  Recorded


 


Acetaminophen Tab [Tylenol] 650 mg PO Q6HR PRN  tab 12/16/22


 


Atorvastatin [Lipitor] 40 mg PO DAILY 30 Days #30 tab 12/16/22


 


Docusate [Colace] 100 mg PO BID 30 Days #60 cap 12/16/22


 


Furosemide [Lasix] 20 mg PO DAILY 30 Days #30 tab 12/16/22


 


Ipratropium-Albuterol Nebulize 3 ml INHALATION RT-QID  each 12/16/22





[Duoneb 0.5 mg-3 mg/3 ml Soln]  


 


Ipratropium-Albuterol Nebulize 3 ml INHALATION RT-QID PRN  each 12/16/22





[Duoneb 0.5 mg-3 mg/3 ml Soln]  


 


Pantoprazole [Protonix] 40 mg PO AC-BRKFST 30 Days #30 tab 12/16/22











                                    Allergies











Allergy/AdvReac Type Severity Reaction Status Date / Time


 


No Known Allergies Allergy   Verified 01/13/23 09:27














Review of Systems


ROS Statement: 


Those systems with pertinent positive or pertinent negative responses have been 

documented in the HPI.





ROS Other: All systems not noted in ROS Statement are negative.





Past Medical History


Past Medical History: COPD


History of Any Multi-Drug Resistant Organisms: None Reported


Past Surgical History: No Surgical Hx Reported


Past Psychological History: No Psychological Hx Reported


Smoking Status: Former smoker


Past Alcohol Use History: None Reported


Past Drug Use History: None Reported





General Exam


Limitations: physical limitation


General appearance: alert, in distress


Head exam: Present: atraumatic, normocephalic, normal inspection


Eye exam: Present: normal appearance, PERRL, EOMI.  Absent: scleral icterus, 

conjunctival injection, periorbital swelling


Respiratory exam: Present: respiratory distress, accessory muscle use, decreased

 breath sounds


Cardiovascular Exam: Present: tachycardia, irregular rhythm


GI/Abdominal exam: Present: soft, normal bowel sounds.  Absent: distended, 

tenderness, guarding, rebound, rigid


Extremities exam: Present: normal inspection, full ROM, normal capillary refill.

  Absent: tenderness, pedal edema, joint swelling, calf tenderness


Back exam: Present: normal inspection


Neurological exam: Present: alert, oriented X3, CN II-XII intact


Psychiatric exam: Present: normal affect, anxious


Skin exam: Present: warm, dry, intact, normal color.  Absent: rash





Course


                                   Vital Signs











  01/13/23 01/13/23 01/13/23





  07:14 07:23 07:29


 


Temperature 97.8 F  


 


Pulse Rate 160 H  


 


Respiratory 36 H 59 H 





Rate   


 


Blood Pressure 111/76  


 


O2 Sat by Pulse 75 L  





Oximetry   


 


Fraction of   100





Inspired Oxygen   





(FIO2)   














  01/13/23 01/13/23 01/13/23





  08:30 09:00 09:54


 


Temperature   


 


Pulse Rate 184 H 152 H 142 H


 


Respiratory 20 22 22





Rate   


 


Blood Pressure 103/59 90/69 108/84


 


O2 Sat by Pulse 97 98 100





Oximetry   


 


Fraction of 100 100 





Inspired Oxygen   





(FIO2)   














  01/13/23 01/13/23 01/13/23





  10:42 10:43 11:05


 


Temperature   


 


Pulse Rate   71


 


Respiratory   26 H





Rate   


 


Blood Pressure   102/47


 


O2 Sat by Pulse 78 L 90 L 93 L





Oximetry   


 


Fraction of   





Inspired Oxygen   





(FIO2)   














  01/13/23 01/13/23 01/13/23





  11:11 11:22 12:24


 


Temperature   97.9 F


 


Pulse Rate 70 78 79


 


Respiratory   24





Rate   


 


Blood Pressure   107/52


 


O2 Sat by Pulse   94 L





Oximetry   


 


Fraction of   





Inspired Oxygen   





(FIO2)   














- Reevaluation(s)


Reevaluation #1: 


ABG obtained.  Patient will be taken off BiPAP at this time


01/13/23 10:39








Procedures





- [place] Protocol (Time Out)


Nurse: Liliana Marrero





- ABG Interpretation


Ph: 7.44


PCO2: 32


PO2: 269


Bicarbonate: 22


Interpretation: normal





Medical Decision Making





- Medical Decision Making





Was pt. sent in by a medical professional or institution?


no


Did you speak to anyone other than the patient for history?  


granddaughter


Did you review nursing and triage notes? 


yes and I agree


Were old charts reviewed? 


previous admissions


Differential Diagnosis? 


MDM Differential Dyspnea:


Coronary syndrome, arrhythmia, tamponade, asthma, COPD, pulmonary embolism, 

pneumonia, pneumothorax, pulmonary effusion, anaphylaxis, diabetic ketoacidosis,

 flailed chest, pulmonary contusion, diaphragmatic rupture, anemia, neuromuscula

r this is not meant to be an all-inclusive list. 


EKG interpreted by me (3pts min.)?


yes


X-rays interpreted by me (1pt min.)?


yes


CT interpreted by me (1pt min.)?


no


U/S interpreted by me (1pt. min.)?


no


What testing was considered but not performed? (CT, X-rays, U/S, labs)? Why?


none


What meds were considered but not given? Why?


none


Did you discuss the management of the patient with other professionals?


admitting physician


Did you reconcile home meds?


yes


Was smoking cessation discussed for >3mins.?


no


Was critical care preformed (if so, how long)?


yes, 35 minutes for bipap management


Were there social determinants of health that impacted care today? How? 

(Homelessness, low income, unemployed, alcoholism, drug addiction, 

transportation, low edu. Level, literacy, decrease access to med. care, assisted, 

rehab)?


no


Was there de-escalation of care discussed even if they declined? (Discuss DNR or

 withdrawal of care, Hospice)?


yes, DNR was discussed and agreed on


What co-morbidities impacted this encounter? (DM, HTN, Smoking, COPD, CAD, 

Cancer, CVA, Hep., AIDS, mental health diagnosis, sleep apnea, morbid obesity)?


afib, COPD, oxygen dependance, 


Was patient admitted / discharged?


Upon arrival patient is placed into a trauma 1.  Oxygen saturation saturations 

are 75% on 6 L.  Due to her increased worker breathing she is placed on BiPAP.  

Placed on continuous pulse ox and cardiac monitoring.  EKG is performed which 

demonstrates that she is in A. fib with a rapid rate.  IV is established.  

Patient given DuoNeb breathing treatment, 1 g of magnesium, 125 of Solu-Medrol, 

1 L of normal saline and started on a Cardizem drip with a Cardizem bolus.  She 

is observed in the emergency department for several hours.  ABGs obtained by 

myself which demonstrates a CO2 of 32 and a O2 of 269.  At this time the patient

 is removed from BiPAP.  She has converted out of A. fib and is now in a normal 

sinus rhythm.  Repeat EKG is performed.  Chest x-ray demonstrates atelectasis.  

Influenza Covid are negative.  Recommended admission for breathing treatments, 

steroids for which the patient was agreeable.  Patient transported to floor in 

stable condition


Undiagnosed new problem with uncertain prognosis?


no


Drug Therapy requiring intensive monitoring for toxicity (Heparin, Nitro, 

Insulin, Cardizem)?


cardizem


Were any procedures done?


no


Diagnosis/symptom?


acute hypoxic resp failure


Acute, or Chronic, or Acute on Chronic?


acute on chronic


Uncomplicated (without systemic symptoms) or Complicated (systemic symptoms)?


complicated


Side effects of treatment?


none


Exacerbation, Progression, or Severe Exacerbation]


severe exacerbation


Poses a threat to life or bodily function?


yes


Diagnosis/symptom?


acute copd exacerbation


Acute, or Chronic, or Acute on Chronic?


acute on chronic


Uncomplicated (without systemic symptoms) or Complicated (systemic symptoms)?


complicated


Side effects of treatment?


none


Exacerbation, Progression, or Severe Exacerbation]


severe exacerbation


Poses a threat to life or bodily function?


yes


Diagnosis/symptom?


afib with rvr


Acute, or Chronic, or Acute on Chronic?


acute on chronic


Uncomplicated (without systemic symptoms) or Complicated (systemic symptoms)?


complicated


Side effects of treatment?


none


Exacerbation, Progression, or Severe Exacerbation]


severe exacerbation


Poses a threat to life or bodily function?


yes





- Lab Data


Result diagrams: 


                                 01/18/23 08:55





                                 01/18/23 08:55


                                   Lab Results











  01/13/23 01/13/23 01/13/23 Range/Units





  07:35 07:35 07:38 


 


WBC  13.4 H    (3.8-10.6)  k/uL


 


RBC  3.48 L    (3.80-5.40)  m/uL


 


Hgb  11.2 L    (11.4-16.0)  gm/dL


 


Hct  36.1    (34.0-46.0)  %


 


MCV  103.8 H    (80.0-100.0)  fL


 


MCH  32.3    (25.0-35.0)  pg


 


MCHC  31.1    (31.0-37.0)  g/dL


 


RDW  15.1    (11.5-15.5)  %


 


Plt Count  400    (150-450)  k/uL


 


MPV  8.4    


 


Neutrophils %  76    %


 


Lymphocytes %  16    %


 


Monocytes %  4    %


 


Eosinophils %  2    %


 


Basophils %  1    %


 


Neutrophils #  10.1 H    (1.3-7.7)  k/uL


 


Lymphocytes #  2.1    (1.0-4.8)  k/uL


 


Monocytes #  0.6    (0-1.0)  k/uL


 


Eosinophils #  0.3    (0-0.7)  k/uL


 


Basophils #  0.1    (0-0.2)  k/uL


 


Hypochromasia  Moderate    


 


Macrocytosis  Moderate    


 


PT   10.4   (9.0-12.0)  sec


 


INR   1.0   (<1.2)  


 


APTT   21.0 L   (22.0-30.0)  sec


 


Sodium    135 L  (137-145)  mmol/L


 


Potassium    4.9  (3.5-5.1)  mmol/L


 


Chloride    97 L  ()  mmol/L


 


Carbon Dioxide    17 L  (22-30)  mmol/L


 


Anion Gap    21  mmol/L


 


BUN    15  (7-17)  mg/dL


 


Creatinine    1.17 H  (0.52-1.04)  mg/dL


 


Est GFR (CKD-EPI)AfAm    51  (>60 ml/min/1.73 sqM)  


 


Est GFR (CKD-EPI)NonAf    44  (>60 ml/min/1.73 sqM)  


 


Glucose    576 H*  (74-99)  mg/dL


 


Lactic Ac Sepsis Rflx     


 


Plasma Lactic Acid Jesse     (0.7-2.0)  mmol/L


 


Calcium    9.0  (8.4-10.2)  mg/dL


 


Magnesium    1.5 L  (1.6-2.3)  mg/dL


 


Total Bilirubin    0.9  (0.2-1.3)  mg/dL


 


AST    65 H  (14-36)  U/L


 


ALT    60 H  (4-34)  U/L


 


Alkaline Phosphatase    102  ()  U/L


 


Troponin I     (0.000-0.034)  ng/mL


 


NT-Pro-B Natriuret Pep     pg/mL


 


Total Protein    7.5  (6.3-8.2)  g/dL


 


Albumin    4.4  (3.5-5.0)  g/dL


 


Coronavirus (PCR)     (Not Detectd)  


 


Influenza Type A RNA     (Not Detectd)  


 


Influenza Type B (PCR)     (Not Detectd)  














  01/13/23 01/13/23 01/13/23 Range/Units





  07:38 07:38 07:38 


 


WBC     (3.8-10.6)  k/uL


 


RBC     (3.80-5.40)  m/uL


 


Hgb     (11.4-16.0)  gm/dL


 


Hct     (34.0-46.0)  %


 


MCV     (80.0-100.0)  fL


 


MCH     (25.0-35.0)  pg


 


MCHC     (31.0-37.0)  g/dL


 


RDW     (11.5-15.5)  %


 


Plt Count     (150-450)  k/uL


 


MPV     


 


Neutrophils %     %


 


Lymphocytes %     %


 


Monocytes %     %


 


Eosinophils %     %


 


Basophils %     %


 


Neutrophils #     (1.3-7.7)  k/uL


 


Lymphocytes #     (1.0-4.8)  k/uL


 


Monocytes #     (0-1.0)  k/uL


 


Eosinophils #     (0-0.7)  k/uL


 


Basophils #     (0-0.2)  k/uL


 


Hypochromasia     


 


Macrocytosis     


 


PT     (9.0-12.0)  sec


 


INR     (<1.2)  


 


APTT     (22.0-30.0)  sec


 


Sodium     (137-145)  mmol/L


 


Potassium     (3.5-5.1)  mmol/L


 


Chloride     ()  mmol/L


 


Carbon Dioxide     (22-30)  mmol/L


 


Anion Gap     mmol/L


 


BUN     (7-17)  mg/dL


 


Creatinine     (0.52-1.04)  mg/dL


 


Est GFR (CKD-EPI)AfAm     (>60 ml/min/1.73 sqM)  


 


Est GFR (CKD-EPI)NonAf     (>60 ml/min/1.73 sqM)  


 


Glucose     (74-99)  mg/dL


 


Lactic Ac Sepsis Rflx     


 


Plasma Lactic Acid Jesse  11.3 H*    (0.7-2.0)  mmol/L


 


Calcium     (8.4-10.2)  mg/dL


 


Magnesium     (1.6-2.3)  mg/dL


 


Total Bilirubin     (0.2-1.3)  mg/dL


 


AST     (14-36)  U/L


 


ALT     (4-34)  U/L


 


Alkaline Phosphatase     ()  U/L


 


Troponin I   <0.012   (0.000-0.034)  ng/mL


 


NT-Pro-B Natriuret Pep    6830  pg/mL


 


Total Protein     (6.3-8.2)  g/dL


 


Albumin     (3.5-5.0)  g/dL


 


Coronavirus (PCR)     (Not Detectd)  


 


Influenza Type A RNA     (Not Detectd)  


 


Influenza Type B (PCR)     (Not Detectd)  














  01/13/23 01/13/23 01/13/23 Range/Units





  07:38 07:38 08:26 


 


WBC     (3.8-10.6)  k/uL


 


RBC     (3.80-5.40)  m/uL


 


Hgb     (11.4-16.0)  gm/dL


 


Hct     (34.0-46.0)  %


 


MCV     (80.0-100.0)  fL


 


MCH     (25.0-35.0)  pg


 


MCHC     (31.0-37.0)  g/dL


 


RDW     (11.5-15.5)  %


 


Plt Count     (150-450)  k/uL


 


MPV     


 


Neutrophils %     %


 


Lymphocytes %     %


 


Monocytes %     %


 


Eosinophils %     %


 


Basophils %     %


 


Neutrophils #     (1.3-7.7)  k/uL


 


Lymphocytes #     (1.0-4.8)  k/uL


 


Monocytes #     (0-1.0)  k/uL


 


Eosinophils #     (0-0.7)  k/uL


 


Basophils #     (0-0.2)  k/uL


 


Hypochromasia     


 


Macrocytosis     


 


PT     (9.0-12.0)  sec


 


INR     (<1.2)  


 


APTT     (22.0-30.0)  sec


 


Sodium     (137-145)  mmol/L


 


Potassium     (3.5-5.1)  mmol/L


 


Chloride     ()  mmol/L


 


Carbon Dioxide     (22-30)  mmol/L


 


Anion Gap     mmol/L


 


BUN     (7-17)  mg/dL


 


Creatinine     (0.52-1.04)  mg/dL


 


Est GFR (CKD-EPI)AfAm     (>60 ml/min/1.73 sqM)  


 


Est GFR (CKD-EPI)NonAf     (>60 ml/min/1.73 sqM)  


 


Glucose     (74-99)  mg/dL


 


Lactic Ac Sepsis Rflx    Y  


 


Plasma Lactic Acid Jesse     (0.7-2.0)  mmol/L


 


Calcium     (8.4-10.2)  mg/dL


 


Magnesium     (1.6-2.3)  mg/dL


 


Total Bilirubin     (0.2-1.3)  mg/dL


 


AST     (14-36)  U/L


 


ALT     (4-34)  U/L


 


Alkaline Phosphatase     ()  U/L


 


Troponin I     (0.000-0.034)  ng/mL


 


NT-Pro-B Natriuret Pep     pg/mL


 


Total Protein     (6.3-8.2)  g/dL


 


Albumin     (3.5-5.0)  g/dL


 


Coronavirus (PCR)   Not Detected   (Not Detectd)  


 


Influenza Type A RNA  Not Detected    (Not Detectd)  


 


Influenza Type B (PCR)  Not Detected    (Not Detectd)  














- EKG Data


EKG Comments: 


EKG demonstrates A. fib with a rate of 178.  QRS 91.  QTC of 315.  Baseline 

artifact.  No appreciated ST segment elevations or depressions





Patient has conversion to normal sinus rhythm and EKG is obtained at 1048 which 

demonstrates sinus rhythm with a rate of 68.  FL interval 129.  QRS 89.  QTC of 

446.  Inverted T waves with ST depression in V1 through V4.  No acute ST segment

elevation








Critical Care Time


Critical Care Time: Yes


Critical Care Time: 


42 minutes








Disposition


Clinical Impression: 


 COPD (chronic obstructive pulmonary disease), Hypoxia, Hyperglycemia





Disposition: ADMITTED AS IP TO THIS HOSP


Condition: Serious


Is patient prescribed a controlled substance at d/c from ED?: No


Time of Disposition: 08:53


Decision to Admit Reason: Admit from EC


Decision Date: 01/13/23


Decision Time: 08:53

## 2023-01-13 NOTE — P.CNPUL
History of Present Illness


Consult date: 01/13/23


Requesting physician: Pako Treviño


Reason for consult: dyspnea, COPD


Chief complaint: Shortness of breath


History of present illness: 





This is a very pleasant 82-year-old female patient with a history of atrial 

fibrillation anticoagulated with Eliquis, diabetes mellitus, chronic obstructive

pulmonary disease requiring home oxygen and she normally wears 6 L at home.  She

was brought in for a 1 week history of worsening shortness of breath, chest 

tightness and palpitations and was found to be hypoxemic at 75% on 6 L nasal 

cannula.  She was also found to be in atrial fibrillation with a rapid 

ventricular response.  She apparently had not been taking her medications at 

home.  White count 13.4.  Hemoglobin 11.2.  .8.  Platelets 400.  Sodium 

135.  Potassium 4.9.  Bicarb 17.  BUN 15.  Creatinine 1.17.  Blood sugar 576.  

Lactic acid 7.1.  Anion gap 21.  AST 65.  ALT 60.  Troponin 0.044.  ProBNP 6830.

 Pro-calcitonin 0.09.  Urine with 4+ glucose trace ketones.  Coronavirus not 

detected.  Influenza screen negative.  Chest x-ray shows bibasilar left midlung 

linear airspace opacities.  No focal consolidation.  No pneumothorax.  She is 

seen today in consultation on the selective care unit.  She is currently sitting

up in bed.  Awake and alert.  She does appear short of breath.  She is using 

some accessory muscles.  Arterial blood gases on 100% FiO2 revealed a PaO2 of 

269, pCO2 32, pH 7.44.  She was initially on BiPAP 12/5 and down to 50% FiO2.  

She's been initiated on DuoNeb inhalations, Symbicort, IV Solu-Medrol.  Be 

started on Cardizem drip at 10 mg per hour.  She is on antibiotics in the form 

of ceftriaxone.  Anticoagulated with Eliquis.





Review of Systems





REVIEW OF SYSTEMS:


CONSTITUTIONAL: Denies any recent significant weight loss or weight gain.


EYES: Denies change in vision.


EARS, NOSE, MOUTH, THROAT: Denies headaches, denies sore throat.


CARDIOVASCULAR: Positive for chest pain, palpitations no syncopal episodes.


RESPIRATORY: Positive for shortness of breath, no cough, congestion or 

hemoptysis.


GASTROINTESTINAL: Denies change in appetite, denies abdominal pain


GENITOURINARY: Denies hematuria, denies infections.


MUSKULOSKELETAL: Denies pain, denies swelling.


INTEGUMENTARY: Denies rash, denies eczema.


NEUROLOGICAL: Denies recent memory loss, no recent seizure activity. 


PSYCHIATRIC: Denies anxiety, denies depression.


HEMATOLOGIC/LYMPHATIC: Denies anemia, denies enlarged lymph nodes.








Past Medical History


Past Medical History: COPD


History of Any Multi-Drug Resistant Organisms: None Reported


Past Surgical History: No Surgical Hx Reported


Past Anesthesia/Blood Transfusion Reactions: No Reported Reaction


Past Psychological History: No Psychological Hx Reported


Smoking Status: Former smoker


Past Alcohol Use History: None Reported


Past Drug Use History: None Reported





Medications and Allergies


                                Home Medications











 Medication  Instructions  Recorded  Confirmed  Type


 


Apixaban [Eliquis] 2.5 mg PO BID 12/14/22 01/13/23 History


 


Cyanocobalamin [Vitamin B-12 1,000 mcg SQ Q30D 12/14/22 01/13/23 History





Injection]    


 


Dapagliflozin Propanediol [Farxiga] 10 mg PO DAILY 12/14/22 01/13/23 History


 


Fluticasone Propion/Salmeterol 1 puff INHALATION RT-BID 12/14/22 01/13/23 

History





[Advair 250-50 Diskus]    


 


Ipratropium/Albuter 20-100Mcg 1 puff INHALATION RT-QID 12/14/22 01/13/23 History





[Combivent Respimat 20-100Mcg    





Inhaler]    


 


Semaglutide [Ozempic] 0.5 mg SQ TH 12/14/22 01/13/23 History


 


Acetaminophen Tab [Tylenol] 650 mg PO Q6HR PRN  tab 12/16/22 01/13/23 Rx


 


Atorvastatin [Lipitor] 40 mg PO DAILY 30 Days #30 tab 12/16/22 01/13/23 Rx


 


Docusate [Colace] 100 mg PO BID 30 Days #60 cap 12/16/22 01/13/23 Rx


 


Furosemide [Lasix] 20 mg PO DAILY 30 Days #30 tab 12/16/22 01/13/23 Rx


 


Ipratropium-Albuterol Nebulize 3 ml INHALATION RT-QID  each 12/16/22 01/13/23 Rx





[Duoneb 0.5 mg-3 mg/3 ml Soln]    


 


Ipratropium-Albuterol Nebulize 3 ml INHALATION RT-QID PRN  each 12/16/22 01/13/23 Rx





[Duoneb 0.5 mg-3 mg/3 ml Soln]    


 


Pantoprazole [Protonix] 40 mg PO AC-BRKFST 30 Days #30 tab 12/16/22 01/13/23 Rx


 


Potassium Chloride ER [K-Dur 10] 10 meq PO DAILY 01/13/23 01/13/23 History


 


metFORMIN HCL 1,000 mg PO BID 01/13/23 01/13/23 History








                                    Allergies











Allergy/AdvReac Type Severity Reaction Status Date / Time


 


No Known Allergies Allergy   Verified 01/13/23 09:27














Physical Exam


Vitals: 


                                   Vital Signs











  Temp Pulse Pulse Resp BP BP Pulse Ox


 


 01/13/23 16:14   76     


 


 01/13/23 16:01   76     


 


 01/13/23 15:24    76    124/58  96


 


 01/13/23 14:35    73  20   


 


 01/13/23 12:41    72    105/66  93 L


 


 01/13/23 12:24  97.9 F  79   24  107/52   94 L


 


 01/13/23 11:22   78     


 


 01/13/23 11:11   70     


 


 01/13/23 11:05   71   26 H  102/47   93 L


 


 01/13/23 10:43        90 L


 


 01/13/23 10:42        78 L


 


 01/13/23 09:54   142 H   22  108/84   100


 


 01/13/23 09:00   152 H   22  90/69   98


 


 01/13/23 08:30   184 H   20  103/59   97


 


 01/13/23 07:29       


 


 01/13/23 07:23     59 H   


 


 01/13/23 07:14  97.8 F  160 H   36 H  111/76   75 L














  FiO2


 


 01/13/23 16:14 


 


 01/13/23 16:01 


 


 01/13/23 15:24 


 


 01/13/23 14:35 


 


 01/13/23 12:41 


 


 01/13/23 12:24 


 


 01/13/23 11:22 


 


 01/13/23 11:11 


 


 01/13/23 11:05 


 


 01/13/23 10:43 


 


 01/13/23 10:42 


 


 01/13/23 09:54 


 


 01/13/23 09:00  100


 


 01/13/23 08:30  100


 


 01/13/23 07:29  100


 


 01/13/23 07:23 


 


 01/13/23 07:14 








                                Intake and Output











 01/13/23 01/13/23 01/13/23





 06:59 14:59 22:59


 


Intake Total  22.500 


 


Balance  22.500 


 


Intake:   


 


  Intake, IV Titration  22.500 





  Amount   


 


    Diltiazem 125 mg In  22.500 





    Sodium Chloride 0.9% 100   





    ml @ 10 MG/HR 10 mls/hr   





    IV .[id number] [facility] Rx#:   





    [id number]   


 


Other:   


 


  Weight  62.596 kg 














GENERAL EXAM: Alert, pleasant 82-year-old female, on 15 L high flow nasal 

cannula.  Alternating with BiPAP 12/5 and 50% FiO2, fairly comfortable in no 

apparent distress.


HEAD: Normocephalic.


EYES: Normal reaction of pupils, equal size.


NOSE: Clear with pink turbinates.


THROAT: No erythema or exudates.


NECK: No masses, no JVD.


CHEST: No chest wall deformity.


LUNGS: Equal air entry with bilateral scattered rhonchi.


CVS: S1 and S2 normal with no audible murmur, irregular rhythm.


ABDOMEN: No hepatosplenomegaly, normal bowel sounds, no guarding or rigidity.


SPINE: No scoliosis or deformity


SKIN: No rashes


CENTRAL NERVOUS SYSTEM: No focal deficits, tone is normal in all 4 extremities.


EXTREMITIES: There is no peripheral edema.  No clubbing, no cyanosis.  

Peripheral pulses are intact.





Results





- Laboratory Findings


CBC and BMP: 


                                 01/13/23 07:35





                                 01/13/23 07:38


ABG











ABG pH  7.44  (7.35-7.45)   01/13/23  10:15    


 


ABG pCO2  32 mmHg (35-45)  L  01/13/23  10:15    


 


ABG pO2  269 mmHg ()  H  01/13/23  10:15    


 


ABG O2 Saturation  100.0 % (94-97)  H  01/13/23  10:15    





PT/INR, D-dimer











PT  10.4 sec (9.0-12.0)   01/13/23  07:35    


 


INR  1.0  (<1.2)   01/13/23  07:35    








Abnormal lab findings: 


                                  Abnormal Labs











  01/13/23 01/13/23 01/13/23





  07:35 07:35 07:38


 


WBC  13.4 H  


 


RBC  3.48 L  


 


Hgb  11.2 L  


 


MCV  103.8 H  


 


Neutrophils #  10.1 H  


 


APTT   21.0 L 


 


ABG pCO2   


 


ABG pO2   


 


ABG O2 Saturation   


 


Sodium    135 L


 


Chloride    97 L


 


Carbon Dioxide    17 L


 


Creatinine    1.17 H


 


Glucose    576 H*


 


POC Glucose (mg/dL)   


 


Plasma Lactic Acid Jesse   


 


Magnesium    1.5 L


 


AST    65 H


 


ALT    60 H


 


Troponin I   


 


Urine Glucose (UA)   


 


Urine Ketones   


 


Ur Leukocyte Esterase   


 


Urine WBC   


 


Urine Bacteria   


 


Urine Mucus   














  01/13/23 01/13/23 01/13/23





  07:38 10:15 10:55


 


WBC   


 


RBC   


 


Hgb   


 


MCV   


 


Neutrophils #   


 


APTT   


 


ABG pCO2   32 L 


 


ABG pO2   269 H 


 


ABG O2 Saturation   100.0 H 


 


Sodium   


 


Chloride   


 


Carbon Dioxide   


 


Creatinine   


 


Glucose   


 


POC Glucose (mg/dL)   


 


Plasma Lactic Acid Jesse  11.3 H*   3.7 H*


 


Magnesium   


 


AST   


 


ALT   


 


Troponin I   


 


Urine Glucose (UA)   


 


Urine Ketones   


 


Ur Leukocyte Esterase   


 


Urine WBC   


 


Urine Bacteria   


 


Urine Mucus   














  01/13/23 01/13/23 01/13/23





  12:25 12:47 14:00


 


WBC   


 


RBC   


 


Hgb   


 


MCV   


 


Neutrophils #   


 


APTT   


 


ABG pCO2   


 


ABG pO2   


 


ABG O2 Saturation   


 


Sodium   


 


Chloride   


 


Carbon Dioxide   


 


Creatinine   


 


Glucose   


 


POC Glucose (mg/dL)   458 H 


 


Plasma Lactic Acid Jesse   


 


Magnesium   


 


AST   


 


ALT   


 


Troponin I    0.044 H*


 


Urine Glucose (UA)  4+ H  


 


Urine Ketones  Trace H  


 


Ur Leukocyte Esterase  Large H  


 


Urine WBC  59 H  


 


Urine Bacteria  Rare H  


 


Urine Mucus  Rare H  














  01/13/23 01/13/23





  14:00 15:27


 


WBC  


 


RBC  


 


Hgb  


 


MCV  


 


Neutrophils #  


 


APTT  


 


ABG pCO2  


 


ABG pO2  


 


ABG O2 Saturation  


 


Sodium  


 


Chloride  


 


Carbon Dioxide  


 


Creatinine  


 


Glucose  


 


POC Glucose (mg/dL)   320 H


 


Plasma Lactic Acid Jesse  7.1 H* 


 


Magnesium  


 


AST  


 


ALT  


 


Troponin I  


 


Urine Glucose (UA)  


 


Urine Ketones  


 


Ur Leukocyte Esterase  


 


Urine WBC  


 


Urine Bacteria  


 


Urine Mucus  














- Diagnostic Findings


Chest x-ray: image reviewed





Assessment and Plan


Assessment: 





Acute on chronic hypoxemic respiratory failure secondary to suspected acute 

exacerbation of diastolic congestive heart failure, acute exacerbation of COPD





Atrial fibrillation with rapid ventricular response, and regulated with Eliquis.

 Currently on a Cardizem drip at 10 mg per hour





Troponin leak





Hyperglycemia with presenting blood sugar of 576 





Diabetes mellitus, type II





Mild transaminitis





Severe chronic obstructive pulmonary disease, oxygen dependent and on 6 L in the

outpatient setting.





History of heavy tobacco dependence however quit approximately 10 years ago





Hyperlipidemia





History of medication noncompliance





Plan:





The patient was seen and evaluated


Chest x-ray, ABGs, labs and medications reviewed


Continue DuoNeb inhalations, IV Solu-Medrol, Symbicort


Continue to titrate down the FiO2 as tolerated


Utilize BiPAP 15/5 with FiO2 50% as needed


Add Lasix 40 mg IV every 12 hours


Follow-up chest x-ray in a.m.


We will continue to follow and make further recommendations based on his 

clinical status





I have personally seen and examined the patient, performed the documentation and

the assessment and plan as written.  Number of minutes spent on the visit: 20.

## 2023-01-13 NOTE — P.HPIM
5/16/2017      Ke Macias  1998      CC: elevated bilirubin    History of present illness  Ke Macias was seen today as a new patient for elevated bilirubin noted by PCP. There was no preceding illness or trauma. He has no abdominal pain, no jaundice or scleral icterus. There is no report of nausea or vomiting, and eats with a good appetite, and there is no report of weight loss. There are no reports of oral reflux symptoms, heartburn, early satiety or dysphagia. Stool are reported to be normal brown and daily, without blood or swathi-anal pain. There are no reports of abnormal urination. There are no reports of chronic fevers. There are no reports of rashes or joint pain. There are no reported headaches      Allergies   Allergen Reactions    Bees [Sting, Bee] Hives       Current Outpatient Prescriptions   Medication Sig Dispense Refill    EPINEPHrine (EPIPEN) 0.3 mg/0.3 mL injection 0.3 mg by IntraMUSCular route once as needed.  ibuprofen 200 mg cap Take 600 mg by mouth. Social History    Lives with Biologic Parent Yes     Adopted No     Foster child No     Multiple Birth No     Smoke exposure No     Pets Yes 1 dog    Other lives with mom, dad, 1 younger sister, [facility]        Family History   Problem Relation Age of Onset    No Known Problems Mother     No Known Problems Father     High Cholesterol Maternal Grandmother     High Cholesterol Maternal Grandfather     Heart Disease Maternal Grandfather     Kidney Disease Maternal Grandfather     Hypertension Maternal Grandfather     Hypertension Paternal Grandmother     High Cholesterol Paternal Grandmother     High Cholesterol Paternal Grandfather        Past Surgical History:   Procedure Laterality Date    HX UROLOGICAL      penis surgery       Immunizations are up to date by report.     Review of Systems  General: no fever or weight loss  Hematologic: denies bruising, excessive bleeding History of Present Illness





This is a pleasant 82 years old female with past medical history of COPD, she 

has also on 6 L of oxygen.


Presents because of worsening dyspnea over one week associated with occasional 

cough and no phlegm.


Patient has central chest pain, nonradiating for the last week, rated as 5/10 in

severity, nonradiating, nonspecific


Patient denies any genitourinary symptoms, no vomiting diarrhea or abdominal 

pain.  No dysuria or urgency.  She has some mild dizziness but no headache, 

weakness or numbness.


She denies smoking alcohol or illicit drugs.  Patient has history of smoking


She cannot remember who is her pulmonologist and she states she doesn't have a 

heart doctor.


Patient is found with A. fib and RVR and emergency room and currently she is on 

Cardizem drip at 10 mg per hour.


Patient is afebrile, she was tachycardic on admission with heart rate up to 190,

currently is 140s.  Patient multiple moderately tachypneic but she's not in 

significant respiratory distress.  Blood pressure 108/84, she is saturating 100%

on BiPAP with pressures of 12/5.


Patient has mild leukocytosis of 13.4, hemoglobin 11.2.  Rest of CBC, INR is 

unremarkable.


Sodium 135, creatinine 1.1


Glucose on admission was 61, currently fifth 176 on the BMP.


Mild hypercalcemia mostly secondary to dehydration, rule out other causes


AST mildly elevated 65 and ALT 60 while removing normal 0.9.  Troponin is 

negative.  ProBNP elevated 6840.


Influenza and covid nondetected


Echocardiogram done on done on 12/16/2022, showing ejection fraction of 55% with

moderate to severe tricuspid regurgitation and moderate to severe pulmonary 

hypertension


Chest x-ray: Vascular and left mid lung andopacity, mostly atelectasis.  

Reviewed the chest x-ray by myself, it shows prominent vascular markings but 

less evident that last month.  No evidence of consolidation.


Patient was started on Cardizem drip, received a bolus of 500 mL, Solu-Medrol 

105 and started on 40 mg.  Lower and insulin.




















Review of Systems





Review of systems


CONSTITUTIONAL: No fever, no malaise, no fatigue. 


HEENT: No recent visual problems or hearing problems. Denied any sore throat. 


CARDIOVASCULAR: No  orthopnea, PND, no palpitations, no syncope. 


PULMONARY: No chest wall tenderness, no hemoptysis. 


GASTROINTESTINAL: No diarrhea, no nausea, no vomiting, no abdominal pain. 

Normoactive bowel sounds. 


NEUROLOGICAL: No headaches, no weakness, no numbness. 


HEMATOLOGICAL: Denies any bleeding or petechiae. 


GENITOURINARY: Denies any burning micturition, frequency, or urgency. 


MUSCULOSKELETAL/RHEUMATOLOGICAL: Denies any joint pain, swelling, or any muscle 

pain. 


ENDOCRINE: Denies any polyuria or polydipsia.





Past Medical History


Past Medical History: COPD


History of Any Multi-Drug Resistant Organisms: None Reported


Past Surgical History: No Surgical Hx Reported


Past Psychological History: No Psychological Hx Reported


Smoking Status: Former smoker


Past Alcohol Use History: None Reported


Past Drug Use History: None Reported





Medications and Allergies


                                Home Medications











 Medication  Instructions  Recorded  Confirmed  Type


 


Apixaban [Eliquis] 2.5 mg PO BID 12/14/22 01/13/23 History


 


Cyanocobalamin [Vitamin B-12 1,000 mcg SQ Q30D 12/14/22 01/13/23 History





Injection]    


 


Dapagliflozin Propanediol [Farxiga] 10 mg PO DAILY 12/14/22 01/13/23 History


 


Fluticasone Propion/Salmeterol 1 puff INHALATION RT-BID 12/14/22 01/13/23 

History





[Advair 250-50 Diskus]    


 


Ipratropium/Albuter 20-100Mcg 1 puff INHALATION RT-QID 12/14/22 01/13/23 History





[Combivent Respimat 20-100Mcg    





Inhaler]    


 


Semaglutide [Ozempic] 0.5 mg SQ TH 12/14/22 01/13/23 History


 


Acetaminophen Tab [Tylenol] 650 mg PO Q6HR PRN  tab 12/16/22 01/13/23 Rx


 


Atorvastatin [Lipitor] 40 mg PO DAILY 30 Days #30 tab 12/16/22 01/13/23 Rx


 


Docusate [Colace] 100 mg PO BID 30 Days #60 cap 12/16/22 01/13/23 Rx


 


Furosemide [Lasix] 20 mg PO DAILY 30 Days #30 tab 12/16/22 01/13/23 Rx


 


Ipratropium-Albuterol Nebulize 3 ml INHALATION RT-QID  each 12/16/22 01/13/23 Rx





[Duoneb 0.5 mg-3 mg/3 ml Soln]    


 


Ipratropium-Albuterol Nebulize 3 ml INHALATION RT-QID PRN  each 12/16/22 01/13/23 Rx





[Duoneb 0.5 mg-3 mg/3 ml Soln]    


 


Pantoprazole [Protonix] 40 mg PO AC-BRKFST 30 Days #30 tab 12/16/22 01/13/23 Rx


 


Potassium Chloride ER [K-Dur 10] 10 meq PO DAILY 01/13/23 01/13/23 History


 


metFORMIN HCL 1,000 mg PO BID 01/13/23 01/13/23 History








                                    Allergies











Allergy/AdvReac Type Severity Reaction Status Date / Time


 


No Known Allergies Allergy   Verified 01/13/23 09:27














Physical Exam


Vitals: 


                                   Vital Signs











  Temp Pulse Resp BP Pulse Ox FiO2


 


 01/13/23 09:54   142 H  22  108/84  100 


 


 01/13/23 09:00   152 H  22  90/69  98  100


 


 01/13/23 08:30   184 H  20  103/59  97  100


 


 01/13/23 07:29       100


 


 01/13/23 07:23    59 H   


 


 01/13/23 07:14  97.8 F  160 H  36 H  111/76  75 L 








                                Intake and Output











 01/12/23 01/13/23 01/13/23





 22:59 06:59 14:59


 


Intake Total   9.167


 


Balance   9.167


 


Intake:   


 


  Intake, IV Titration   9.167





  Amount   


 


    Diltiazem 125 mg In   9.167





    Sodium Chloride 0.9% 100   





    ml @ 10 MG/HR 10 mls/hr   





    [id number]   





    [id number]   


 


Other:   


 


  Weight   62.596 kg














GENERAL: The patient is alert and oriented x3, not in any acute distress. Well 

developed, well nourished. 


HEENT: Pupils are round and equally reacting to light. EOMI. No scleral icterus.

No conjunctival pallor. Normocephalic, atraumatic. No pharyngeal erythema. No 

thyromegaly. 


CARDIOVASCULAR: S1 and S2 present. No murmurs, rubs, or gallops. 


-PULMONARY: Chest is clear to auscultation, no wheezing or crackles.  Bilateral 

limiting air entry on both sides, patient is on BiPAP


ABDOMEN: Soft, nontender, nondistended, normoactive bowel sounds. No palpable 

organomegaly. 


MUSCULOSKELETAL: No joint swelling or deformity. 


EXTREMITIES: No cyanosis, clubbing, or pedal edema. 


NEUROLOGICAL: Gross neurological examination did not reveal any focal deficits. 


SKIN: No rashes. no petechiae.





Results


CBC & Chem 7: 


                                 01/13/23 07:35





                                 01/13/23 07:38


Labs: 


                  Abnormal Lab Results - Last 24 Hours (Table)











  01/13/23 01/13/23 01/13/23 Range/Units





  07:35 07:35 07:38 


 


WBC  13.4 H    (3.8-10.6)  k/uL


 


RBC  3.48 L    (3.80-5.40)  m/uL


 


Hgb  11.2 L    (11.4-16.0)  gm/dL


 


MCV  103.8 H    (80.0-100.0)  fL


 


Neutrophils #  10.1 H    (1.3-7.7)  k/uL


 


APTT   21.0 L   (22.0-30.0)  sec


 


ABG pCO2     (35-45)  mmHg


 


ABG pO2     ()  mmHg


 


ABG O2 Saturation     (94-97)  %


 


Sodium    135 L  (137-145)  mmol/L


 


Chloride    97 L  ()  mmol/L


 


Carbon Dioxide    17 L  (22-30)  mmol/L


 


Creatinine    1.17 H  (0.52-1.04)  mg/dL


 


Glucose    576 H*  (74-99)  mg/dL


 


Plasma Lactic Acid Jesse     (0.7-2.0)  mmol/L


 


Magnesium    1.5 L  (1.6-2.3)  mg/dL


 


AST    65 H  (14-36)  U/L


 


ALT    60 H  (4-34)  U/L














  01/13/23 01/13/23 Range/Units





  07:38 10:15 


 


WBC    (3.8-10.6)  k/uL


 


RBC    (3.80-5.40)  m/uL


 


Hgb    (11.4-16.0)  gm/dL


 


MCV    (80.0-100.0)  fL


 


Neutrophils #    (1.3-7.7)  k/uL


 


APTT    (22.0-30.0)  sec


 


ABG pCO2   32 L  (35-45)  mmHg


 


ABG pO2   269 H  ()  mmHg


 


ABG O2 Saturation   100.0 H  (94-97)  %


 


Sodium    (137-145)  mmol/L


 


Chloride    ()  mmol/L


 


Carbon Dioxide    (22-30)  mmol/L


 


Creatinine    (0.52-1.04)  mg/dL


 


Glucose    (74-99)  mg/dL


 


Plasma Lactic Acid Jesse  11.3 H*   (0.7-2.0)  mmol/L


 


Magnesium    (1.6-2.3)  mg/dL


 


AST    (14-36)  U/L


 


ALT    (4-34)  U/L














Assessment and Plan


Assessment: 





acute CHF exacerbation.


Acute COPD exacerbation


Acute and chronic hypoxic respiratory failure


A. fib and RVR, present on admission, on Eliquis at home


moderate to severe tricuspid regurgitation and moderate to severe pulmonary 

hypertension


Chronic kidney disease stage III


Diabetes mellitus with Hypoglycemia and hyperglycemia more than 500 on 

admission,


Hyperlipidemia


Hypercalcemia


Mild transaminitis














Plan: 





We will give one dose of Lasix,


Marcy time dose of IV Lasix, and continue with Cardizem drip


Continue with BiPAP


[facility]


With a [facility]odilator


Continue with oxygen as needed


Cardiology and Pulmonary consult


nephrologist consult


Labs and medication were reviewed..  Continue same treatment.  Continue with 

symptomatic treatment.  Resume home medication.  Monitor labs and vitals.  DVT 

and GI prophylaxis.  Further recommendations as per clinical course of the 

patient


DVT prophylaxis: Eliquis


GI Prophylaxis: Ppi


PT/OT: Pending


Prognosis is guarded Head/Neck: denies vision changes, sore throat, runny nose, nose bleeds, or hearing changes  Respiratory: denies cough, shortness of breath, wheezing, stridor, or cough  Cardiovascular: denies chest pain, hypertension, palpitations, syncope, dyspnea on exertion  Gastrointestinal:no jaundice or abdominal pain  Genitourinary: denies dysuria, frequency, urgency, or enuresis or daytime wetting  Musculoskeletal: denies pain, swelling, redness of muscles or joints  Neurologic: denies convulsions, paralyses, or tremor  Dermatologic: denies rash, itching, or dryness  Psychiatric/Behavior: denies emotional problems, anxiety, depression, or previous psychiatric care  Lymphatic: denies local or general lymph node enlargement or tenderness  Endocrine: denies polydipsia, polyuria, intolerance to heat or cold, or abnormal sexual development. Allergic: denies known reactions to drugs      Physical Exam   height is 5' 6.42\" (1.687 m) and weight is 139 lb (63 kg). His oral temperature is 98.1 °F (36.7 °C). His blood pressure is 118/70 and his pulse is 86. His respiration is 16 and oxygen saturation is 99%. General: He is awake, alert, and in no distress, and appears to be well nourished and well hydrated. HEENT: The sclera appear anicteric, the conjunctiva pink, the oral mucosa appears without lesions, and the dentition is fair. Chest: Clear breath sounds   CV: Regular rate and rhythm   Abdomen: soft, non-tender, non-distended, without masses.  There is no hepatosplenomegaly  Extremities: well perfused with no joint abnormalities  Skin: no rash, no jaundice  Neuro: moves all 4 well, normal gait  Lymph: no significant lymphadenopathy      Labs reviewed and unremarkable with elevated bilirubin to 1.5, direct 0.35, normal AST, ALT and other labs    Impression       Impression  Michellecarrie Hargrove is 17 y.o.  with elevated bilirubin most likely from Gilbert's or some variation on a benign process (variant of rotors with slight elevation of direct bilirubin, although less than 0.4?). He has a normal exam and has demonstrated normal growth, on the lower end of the growth curve - father had late growth spurt as well. I have no concern for any significant liver disease and do not feel genetic testing for Gilbert's for example would provide any additional information. He does have Mediterranean ancestry. Plan/Recommendation  F/u as needed - mom is welcome to call our office if he has any jaundice in the future or she has other questions           All patient and caregiver questions and concerns were addressed during the visit. Major risks, benefits, and side-effects of therapy were discussed.

## 2023-01-13 NOTE — P.CONS
History of Present Illness





- Reason for Consult


Consult date: 01/13/23


Goals of care


Requesting physician: Pako E Sheet





- Chief Complaint


Shortness of breath





- History of Present Illness


The patient is an 82-year-old female with a past medical history significant for

hyperlipidemia, A. fib on Eliquis, diabetes mellitus type 2, chronic kidney 

disease, CHF, and COPD there is 5-6 L home O2.  She is a former heavy smoker and

reports quitting 10 years ago.  The patient presented to the emergency 

department on 1/13/23 complaints of worsening dyspnea over 1 week with 

associated cough.  She was hypoxic in the emergency department and placed on 

BiPAP 12/5 100% FiO2.  EKG demonstrated A. fib with RVR, patient was placed on a

Cardizem drip and converted back to a normal sinus rhythm.  








Review of Systems


Constitutional: Denies chills, Denies fever


Ears, nose, mouth and throat: Denies dysphagia, Denies headache


Cardiovascular: Reports chest pain, Reports lightheadedness, Reports shortness 

of breath


Respiratory: Reports cough, Reports home oxygen


Gastrointestinal: Denies diarrhea, Denies loss of appetite, Denies nausea, 

Denies vomiting


Genitourinary: Denies difficulty voiding


Psychiatric: Reports anxiety, Reports depression





Past Medical History


Past Medical History: COPD


History of Any Multi-Drug Resistant Organisms: None Reported


Past Surgical History: No Surgical Hx Reported


Past Psychological History: No Psychological Hx Reported


Smoking Status: Former smoker


Past Alcohol Use History: None Reported


Past Drug Use History: None Reported





Medications and Allergies


                                Home Medications











 Medication  Instructions  Recorded  Confirmed  Type


 


Apixaban [Eliquis] 2.5 mg PO BID 12/14/22 01/13/23 History


 


Cyanocobalamin [Vitamin B-12 1,000 mcg SQ Q30D 12/14/22 01/13/23 History





Injection]    


 


Dapagliflozin Propanediol [Farxiga] 10 mg PO DAILY 12/14/22 01/13/23 History


 


Fluticasone Propion/Salmeterol 1 puff INHALATION RT-BID 12/14/22 01/13/23 

History





[Advair 250-50 Diskus]    


 


Ipratropium/Albuter 20-100Mcg 1 puff INHALATION RT-QID 12/14/22 01/13/23 History





[Combivent Respimat 20-100Mcg    





Inhaler]    


 


Semaglutide [Ozempic] 0.5 mg SQ TH 12/14/22 01/13/23 History


 


Acetaminophen Tab [Tylenol] 650 mg PO Q6HR PRN  tab 12/16/22 01/13/23 Rx


 


Atorvastatin [Lipitor] 40 mg PO DAILY 30 Days #30 tab 12/16/22 01/13/23 Rx


 


Docusate [Colace] 100 mg PO BID 30 Days #60 cap 12/16/22 01/13/23 Rx


 


Furosemide [Lasix] 20 mg PO DAILY 30 Days #30 tab 12/16/22 01/13/23 Rx


 


Ipratropium-Albuterol Nebulize 3 ml INHALATION RT-QID  each 12/16/22 01/13/23 Rx





[Duoneb 0.5 mg-3 mg/3 ml Soln]    


 


Ipratropium-Albuterol Nebulize 3 ml INHALATION RT-QID PRN  each 12/16/22 01/13/23 Rx





[Duoneb 0.5 mg-3 mg/3 ml Soln]    


 


Pantoprazole [Protonix] 40 mg PO AC-BRKFST 30 Days #30 tab 12/16/22 01/13/23 Rx


 


Potassium Chloride ER [K-Dur 10] 10 meq PO DAILY 01/13/23 01/13/23 History


 


metFORMIN HCL 1,000 mg PO BID 01/13/23 01/13/23 History








                                    Allergies











Allergy/AdvReac Type Severity Reaction Status Date / Time


 


No Known Allergies Allergy   Verified 01/13/23 09:27














Physical Exam


Vitals: 


                                   Vital Signs











  Temp Pulse Resp BP Pulse Ox FiO2


 


 01/13/23 12:24  97.9 F  79  24  107/52  94 L 


 


 01/13/23 11:22   78    


 


 01/13/23 11:11   70    


 


 01/13/23 11:05   71  26 H  102/47  93 L 


 


 01/13/23 10:43      90 L 


 


 01/13/23 10:42      78 L 


 


 01/13/23 09:54   142 H  22  108/84  100 


 


 01/13/23 09:00   152 H  22  90/69  98  100


 


 01/13/23 08:30   184 H  20  103/59  97  100


 


 01/13/23 07:29       100


 


 01/13/23 07:23    59 H   


 


 01/13/23 07:14  97.8 F  160 H  36 H  111/76  75 L 








                                Intake and Output











 01/12/23 01/13/23 01/13/23





 22:59 06:59 14:59


 


Intake Total   22.500


 


Balance   22.500


 


Intake:   


 


  Intake, IV Titration   22.500





  Amount   


 


    Diltiazem 125 mg In   22.500





    Sodium Chloride 0.9% 100   





    ml @ 10 MG/HR 10 mls/hr   





    IV .[id number] [facility] Rx#:   





    [id number]   


 


Other:   


 


  Weight   62.596 kg











General: Well developed, well nourished. No acute distress. Chronically ill 

appearing  


HEENT: Head is atraumatic, normocephalic. Sclera are clear. Mucus membranes dry


CV: Heart regular in rate, irregular rhythm positive, S1 and S2.  No peripheral 

edema


Lungs: Diminished throughout.  Respirations labored.  On NRB 


Abdomen/GI: Soft.No guarding, rigidity, or abdominal tenderness.   


Musculoskeletal/ Extremities: NAILS, no joint deformity or swelling. No gross 

atrophy. + generalized weakness


Skin: Warm and dry


Neurologic: Awake, alert and oriented times 3. CN II-XII grossly intact. No 

focal deficits.


Psychiatric: Appropriate mood and affect.











Results


CBC & Chem 7: 


                                 01/13/23 07:35





                                 01/13/23 07:38


Labs: 


                  Abnormal Lab Results - Last 24 Hours (Table)











  01/13/23 01/13/23 01/13/23 Range/Units





  07:35 07:35 07:38 


 


WBC  13.4 H    (3.8-10.6)  k/uL


 


RBC  3.48 L    (3.80-5.40)  m/uL


 


Hgb  11.2 L    (11.4-16.0)  gm/dL


 


MCV  103.8 H    (80.0-100.0)  fL


 


Neutrophils #  10.1 H    (1.3-7.7)  k/uL


 


APTT   21.0 L   (22.0-30.0)  sec


 


ABG pCO2     (35-45)  mmHg


 


ABG pO2     ()  mmHg


 


ABG O2 Saturation     (94-97)  %


 


Sodium    135 L  (137-145)  mmol/L


 


Chloride    97 L  ()  mmol/L


 


Carbon Dioxide    17 L  (22-30)  mmol/L


 


Creatinine    1.17 H  (0.52-1.04)  mg/dL


 


Glucose    576 H*  (74-99)  mg/dL


 


Plasma Lactic Acid Jesse     (0.7-2.0)  mmol/L


 


Magnesium    1.5 L  (1.6-2.3)  mg/dL


 


AST    65 H  (14-36)  U/L


 


ALT    60 H  (4-34)  U/L














  01/13/23 01/13/23 01/13/23 Range/Units





  07:38 10:15 10:55 


 


WBC     (3.8-10.6)  k/uL


 


RBC     (3.80-5.40)  m/uL


 


Hgb     (11.4-16.0)  gm/dL


 


MCV     (80.0-100.0)  fL


 


Neutrophils #     (1.3-7.7)  k/uL


 


APTT     (22.0-30.0)  sec


 


ABG pCO2   32 L   (35-45)  mmHg


 


ABG pO2   269 H   ()  mmHg


 


ABG O2 Saturation   100.0 H   (94-97)  %


 


Sodium     (137-145)  mmol/L


 


Chloride     ()  mmol/L


 


Carbon Dioxide     (22-30)  mmol/L


 


Creatinine     (0.52-1.04)  mg/dL


 


Glucose     (74-99)  mg/dL


 


Plasma Lactic Acid Jesse  11.3 H*   3.7 H*  (0.7-2.0)  mmol/L


 


Magnesium     (1.6-2.3)  mg/dL


 


AST     (14-36)  U/L


 


ALT     (4-34)  U/L











Chest x-ray: report reviewed





Assessment and Plan


Assessment: 


Social


* Occupation - retired, previously was a made/


* Marital status -  since 1999


* Children/grandchildren - 2 adult children, one boy and one girl.


* Residence - house


* Who do you reside with - with granddaughter, Mabel


* ETOH - no


* Tobacco - former heavy smoker, reports quitting approximately 10 years ago


* Illicit drugs - no





Spiritual/Cultural


* A spiritual person - somewhat


* Jainism - Taoist


* Belong to a particular Orthodoxy - does not go to Orthodoxy


* Beliefs a source of comfort and  strength - yes


* Church or cultural practices restrictions - no


* EOL considerations/rituals -none





Functional Assessment


* Able to walk independently - yes


* Assistive devices - patient has a walker but does not use it


* Able to use the bathroom independently - yes


* Continent - yes


* Require assistance bathing- no, uses shower chair


* Able to feed self - yes


* Who prepares meals - granddaughter


* How many meals a day eaten - 2


* Able to clean house/do laundry - no


* Transportation - patient does not drive, her granddaughter provides 

  transportation


* Able to shop - no


* Who manages medications - the patient's granddaughterMabel


* Who manages finances - the patient's granddaughterMabel





Psychological/Emotional 


* Dementia present - no


* Insight and judgment - intact


* Depression - occasionally


* Suicidal thoughts - no 


* Good support system - yes


* Patients goals - prolonged survival


* Frequent hospitalizations - no


* Desire to keep coming back to the hospital for treatment - yes





Symptoms


* Pain - 0/10, continue Tylenol when necessary


* Fatigue -  reports good energy level, continue B12


* SOB - + SOB at rest, continue Symbicort, DuoNeb, and Solu-Medrol


* Insomnia - no


* N/V - no


* Anxiety - yes, continue Xanax


* Depression - occasional


* Confusion - no


* Agitation - no


* Hallucinations - no


* Appetite/weight loss - patient reports having a good appetite, no recent 

  weight loss, continue healthy diet


* Dysphagia - no


* Constipation - occasional, continue Colace


* Incontinence - no 


* Itch - no


* Cough - + nonproductive cough








Plan: 


Summary/Goals - the patient has just arrived up into her room from the emergency

department.  She is seen sitting up in bed with a nonrebreather mask on.  She is

alert and oriented 3 and able to  answer questions appropriately.  Information 

regarding palliative care philosophies and services provided.  Education 

regarding her chronic illnesses including COPD, CHF, and diabetes was also 

provided.  The patient states that she is very happy with her current quality of

life.  She is not able to physically do everything that she would like to, but 

has accepted that.  She states it's part of getting old.  She enjoys living with

her granddaughter and reports that she takes great care of her.  Her current 

goal of care is for prolonged survival.  She is aware that there is no cure for 

her COPD and that our treatments are limited.  She wears 5-6 L home O2.  She is 

hoping to respond well to her treatment and return to her previous baseline.  S

he wishes to return home to live with her granddaughter.  CODE STATUS reviewed 

in detail.  The patient wishes to remain a DO NOT RESUSCITATE.  We will continue

to follow this patient





Recommendations - outpatient palliative care





Advanced Directives - none on file





Code Status - DO NOT RESUSCITATE





Thank you for this consultation





Ailyn MARSHALLP-BC


Palliative Care


Spectralink [id number]


Email: [email or web address]








Time with Patient: Greater than 30

## 2023-01-13 NOTE — XR
EXAMINATION TYPE: XR chest 1V portable

 

DATE OF EXAM: 1/13/2023 8:32 AM

 

COMPARISON: Chest radiographs from 12/13/2022.

 

TECHNIQUE: XR chest 1V portable Frontal view of the chest.

 

CLINICAL INDICATION:Female, 82 years old with history of LUIS A; 

 

FINDINGS: 

Lungs/Pleura: There is no evidence of pleural effusion, focal consolidation, or pneumothorax.  Bibasi
lar and left midlung linear airspace opacities.

Pulmonary vascularity: Unremarkable.

Heart/mediastinum: Cardiomediastinal silhouette is prominent in size and stable.  Atherosclerotic kermit
cifications are seen in the aorta.

Musculoskeletal: No acute osseous pathology.

 

 

IMPRESSION: 

Bibasilar and left midlung linear airspace opacities redemonstrated likely representing atelectasis.

## 2023-01-14 LAB
ANION GAP SERPL CALC-SCNC: 8 MMOL/L
BASOPHILS # BLD AUTO: 0 K/UL (ref 0–0.2)
BASOPHILS NFR BLD AUTO: 0 %
BUN SERPL-SCNC: 19 MG/DL (ref 7–17)
CALCIUM SPEC-MCNC: 8.5 MG/DL (ref 8.4–10.2)
CHLORIDE SERPL-SCNC: 98 MMOL/L (ref 98–107)
CO2 SERPL-SCNC: 25 MMOL/L (ref 22–30)
EOSINOPHIL # BLD AUTO: 0.1 K/UL (ref 0–0.7)
EOSINOPHIL NFR BLD AUTO: 1 %
ERYTHROCYTE [DISTWIDTH] IN BLOOD BY AUTOMATED COUNT: 2.63 M/UL (ref 3.8–5.4)
ERYTHROCYTE [DISTWIDTH] IN BLOOD BY AUTOMATED COUNT: 2.67 M/UL (ref 3.8–5.4)
ERYTHROCYTE [DISTWIDTH] IN BLOOD: 14.8 % (ref 11.5–15.5)
ERYTHROCYTE [DISTWIDTH] IN BLOOD: 15 % (ref 11.5–15.5)
GLUCOSE BLD-MCNC: 280 MG/DL (ref 70–110)
GLUCOSE BLD-MCNC: 322 MG/DL (ref 70–110)
GLUCOSE BLD-MCNC: 328 MG/DL (ref 70–110)
GLUCOSE BLD-MCNC: 408 MG/DL (ref 70–110)
GLUCOSE SERPL-MCNC: 309 MG/DL (ref 74–99)
HCT VFR BLD AUTO: 26.3 % (ref 34–46)
HCT VFR BLD AUTO: 26.9 % (ref 34–46)
HGB BLD-MCNC: 8.4 GM/DL (ref 11.4–16)
HGB BLD-MCNC: 8.5 GM/DL (ref 11.4–16)
IRON SERPL-MCNC: 30 UG/DL (ref 50–170)
LYMPHOCYTES # SPEC AUTO: 0.6 K/UL (ref 1–4.8)
LYMPHOCYTES NFR SPEC AUTO: 6 %
MAGNESIUM SPEC-SCNC: 1.4 MG/DL (ref 1.6–2.3)
MCH RBC QN AUTO: 31.4 PG (ref 25–35)
MCH RBC QN AUTO: 32.2 PG (ref 25–35)
MCHC RBC AUTO-ENTMCNC: 31.1 G/DL (ref 31–37)
MCHC RBC AUTO-ENTMCNC: 32.2 G/DL (ref 31–37)
MCV RBC AUTO: 100.1 FL (ref 80–100)
MCV RBC AUTO: 100.8 FL (ref 80–100)
MONOCYTES # BLD AUTO: 0.2 K/UL (ref 0–1)
MONOCYTES NFR BLD AUTO: 2 %
NEUTROPHILS # BLD AUTO: 10 K/UL (ref 1.3–7.7)
NEUTROPHILS NFR BLD AUTO: 91 %
PLATELET # BLD AUTO: 247 K/UL (ref 150–450)
PLATELET # BLD AUTO: 252 K/UL (ref 150–450)
POTASSIUM SERPL-SCNC: 4.4 MMOL/L (ref 3.5–5.1)
SODIUM SERPL-SCNC: 131 MMOL/L (ref 137–145)
TIBC SERPL-MCNC: 363 UG/DL (ref 228–460)
WBC # BLD AUTO: 11.1 K/UL (ref 3.8–10.6)
WBC # BLD AUTO: 15.6 K/UL (ref 3.8–10.6)

## 2023-01-14 RX ADMIN — IPRATROPIUM BROMIDE AND ALBUTEROL SULFATE SCH ML: .5; 3 SOLUTION RESPIRATORY (INHALATION) at 11:42

## 2023-01-14 RX ADMIN — DAPAGLIFLOZIN SCH MG: 10 TABLET, FILM COATED ORAL at 08:22

## 2023-01-14 RX ADMIN — INSULIN ASPART SCH UNIT: 100 INJECTION, SOLUTION INTRAVENOUS; SUBCUTANEOUS at 16:59

## 2023-01-14 RX ADMIN — MAGNESIUM SULFATE IN DEXTROSE SCH MLS/HR: 10 INJECTION, SOLUTION INTRAVENOUS at 12:32

## 2023-01-14 RX ADMIN — IPRATROPIUM BROMIDE AND ALBUTEROL SULFATE SCH ML: .5; 3 SOLUTION RESPIRATORY (INHALATION) at 19:46

## 2023-01-14 RX ADMIN — DOCUSATE SODIUM SCH MG: 100 CAPSULE, LIQUID FILLED ORAL at 08:22

## 2023-01-14 RX ADMIN — METHYLPREDNISOLONE SODIUM SUCCINATE SCH MG: 125 INJECTION, POWDER, FOR SOLUTION INTRAMUSCULAR; INTRAVENOUS at 11:30

## 2023-01-14 RX ADMIN — INSULIN DETEMIR SCH UNIT: 100 INJECTION, SOLUTION SUBCUTANEOUS at 06:22

## 2023-01-14 RX ADMIN — DILTIAZEM HYDROCHLORIDE SCH: 5 INJECTION INTRAVENOUS at 11:25

## 2023-01-14 RX ADMIN — FORMOTEROL FUMARATE DIHYDRATE SCH MCG: 20 SOLUTION RESPIRATORY (INHALATION) at 19:46

## 2023-01-14 RX ADMIN — INSULIN ASPART SCH UNIT: 100 INJECTION, SOLUTION INTRAVENOUS; SUBCUTANEOUS at 06:23

## 2023-01-14 RX ADMIN — DOCUSATE SODIUM SCH MG: 100 CAPSULE, LIQUID FILLED ORAL at 20:25

## 2023-01-14 RX ADMIN — APIXABAN SCH MG: 2.5 TABLET, FILM COATED ORAL at 20:25

## 2023-01-14 RX ADMIN — METHYLPREDNISOLONE SODIUM SUCCINATE SCH MG: 125 INJECTION, POWDER, FOR SOLUTION INTRAMUSCULAR; INTRAVENOUS at 18:27

## 2023-01-14 RX ADMIN — IPRATROPIUM BROMIDE AND ALBUTEROL SULFATE SCH ML: .5; 3 SOLUTION RESPIRATORY (INHALATION) at 07:45

## 2023-01-14 RX ADMIN — MAGNESIUM SULFATE IN DEXTROSE SCH MLS/HR: 10 INJECTION, SOLUTION INTRAVENOUS at 11:29

## 2023-01-14 RX ADMIN — PANTOPRAZOLE SODIUM SCH MG: 40 TABLET, DELAYED RELEASE ORAL at 06:22

## 2023-01-14 RX ADMIN — APIXABAN SCH MG: 2.5 TABLET, FILM COATED ORAL at 08:22

## 2023-01-14 RX ADMIN — DILTIAZEM HYDROCHLORIDE SCH MG: 30 TABLET, FILM COATED ORAL at 15:43

## 2023-01-14 RX ADMIN — BUDESONIDE SCH MG: 1 SUSPENSION RESPIRATORY (INHALATION) at 19:46

## 2023-01-14 RX ADMIN — ACETAMINOPHEN PRN MG: 325 TABLET, FILM COATED ORAL at 22:41

## 2023-01-14 RX ADMIN — MAGNESIUM SULFATE IN DEXTROSE SCH MLS/HR: 10 INJECTION, SOLUTION INTRAVENOUS at 13:39

## 2023-01-14 RX ADMIN — IPRATROPIUM BROMIDE AND ALBUTEROL SULFATE SCH ML: .5; 3 SOLUTION RESPIRATORY (INHALATION) at 00:58

## 2023-01-14 RX ADMIN — MAGNESIUM SULFATE IN DEXTROSE SCH MLS/HR: 10 INJECTION, SOLUTION INTRAVENOUS at 14:38

## 2023-01-14 RX ADMIN — INSULIN ASPART SCH: 100 INJECTION, SOLUTION INTRAVENOUS; SUBCUTANEOUS at 20:55

## 2023-01-14 RX ADMIN — FUROSEMIDE SCH MG: 10 INJECTION, SOLUTION INTRAMUSCULAR; INTRAVENOUS at 06:23

## 2023-01-14 RX ADMIN — DILTIAZEM HYDROCHLORIDE SCH MG: 30 TABLET, FILM COATED ORAL at 22:41

## 2023-01-14 RX ADMIN — BUDESONIDE AND FORMOTEROL FUMARATE DIHYDRATE SCH PUFF: 80; 4.5 AEROSOL RESPIRATORY (INHALATION) at 07:45

## 2023-01-14 RX ADMIN — IPRATROPIUM BROMIDE AND ALBUTEROL SULFATE SCH ML: .5; 3 SOLUTION RESPIRATORY (INHALATION) at 04:05

## 2023-01-14 RX ADMIN — ATORVASTATIN CALCIUM SCH MG: 40 TABLET, FILM COATED ORAL at 08:22

## 2023-01-14 RX ADMIN — METHYLPREDNISOLONE SODIUM SUCCINATE SCH MG: 40 INJECTION, POWDER, FOR SOLUTION INTRAMUSCULAR; INTRAVENOUS at 08:22

## 2023-01-14 RX ADMIN — IPRATROPIUM BROMIDE AND ALBUTEROL SULFATE SCH ML: .5; 3 SOLUTION RESPIRATORY (INHALATION) at 15:52

## 2023-01-14 RX ADMIN — INSULIN ASPART SCH UNIT: 100 INJECTION, SOLUTION INTRAVENOUS; SUBCUTANEOUS at 12:23

## 2023-01-14 RX ADMIN — FUROSEMIDE SCH MG: 10 INJECTION, SOLUTION INTRAMUSCULAR; INTRAVENOUS at 18:22

## 2023-01-14 RX ADMIN — METHYLPREDNISOLONE SODIUM SUCCINATE SCH MG: 125 INJECTION, POWDER, FOR SOLUTION INTRAMUSCULAR; INTRAVENOUS at 22:41

## 2023-01-14 NOTE — XR
EXAMINATION TYPE: XR chest 1V portable

 

DATE OF EXAM: 1/14/2023 10:51 AM

 

COMPARISON: Chest radiographs from 1/13/2023

 

TECHNIQUE: XR chest 1V portable Portable AP radiograph of the chest.

 

CLINICAL INDICATION:Female, 82 years old with history of Hypoxemia; 

 

FINDINGS: 

Lungs/Pleura: Airspace opacities projecting over the heart. No evidence of pneumothorax, focal consol
idation or pleural effusion. 

Pulmonary vascularity: Unremarkable.

Heart/mediastinum: Cardiomediastinal silhouette is unremarkable.

Musculoskeletal: No acute osseous pathology.

 

IMPRESSION: 

Airspace opacities projecting over the heart on the left. Correlate for pneumonia.

## 2023-01-14 NOTE — P.PN
Subjective


Progress Note Date: 01/14/23


This is a pleasant 82 years old female with past medical history of COPD, she 

has also on 6 L of oxygen.


Presents because of worsening dyspnea over one week associated with occasional 

cough and no phlegm.


Patient has central chest pain, nonradiating for the last week, rated as 5/10 in

severity, nonradiating, nonspecific


Patient denies any genitourinary symptoms, no vomiting diarrhea or abdominal 

pain.  No dysuria or urgency.  She has some mild dizziness but no headache, 

weakness or numbness.


She denies smoking alcohol or illicit drugs.  Patient has history of smoking


She cannot remember who is her pulmonologist and she states she doesn't have a 

heart doctor.


Patient is found with A. fib and RVR and emergency room and currently she is on 

Cardizem drip at 10 mg per hour.


Patient is afebrile, she was tachycardic on admission with heart rate up to 190,

currently is 140s.  Patient multiple moderately tachypneic but she's not in 

significant respiratory distress.  Blood pressure 108/84, she is saturating 100%

on BiPAP with pressures of 12/5.


Patient has mild leukocytosis of 13.4, hemoglobin 11.2.  Rest of CBC, INR is 

unremarkable.


Sodium 135, creatinine 1.1


Glucose on admission was 61, currently fifth 176 on the BMP.


Mild hypercalcemia mostly secondary to dehydration, rule out other causes


AST mildly elevated 65 and ALT 60 while removing normal 0.9.  Troponin is 

negative.  ProBNP elevated 6840.


Influenza and covid nondetected


Echocardiogram done on done on 12/16/2022, showing ejection fraction of 55% with

moderate to severe tricuspid regurgitation and moderate to severe pulmonary 

hypertension


Chest x-ray: Vascular and left mid lung andopacity, mostly atelectasis.  

Reviewed the chest x-ray by myself, it shows prominent vascular markings but 

less evident that last month.  No evidence of consolidation.


Patient was started on Cardizem drip, received a bolus of 500 mL, Solu-Medrol 

105 and started on 40 mg.  Lower and insulin.





1/14


Patient seen and examined.  Currently on BiPAP.  States she feels better.  

Denies any blood in the stools.  Case discussed with nursing staff








REVIEW OF SYSTEMS: 


CONSTITUTIONAL: No fever, no malaise,. 


CARDIOVASCULAR: No chest pain, no palpitations, no syncope. 


PULMONARY: No shortness of breath, no cough, 


GASTROINTESTINAL: No diarrhea, no nausea, no vomiting, no abdominal pain. 


NEUROLOGICAL: No headaches, no weakness, 





PHYSICAL EXAMINATION: 





GENERAL: The patient is alert and oriented x3, not in any acute distress. Well d

eveloped, well nourished. 


HEENT: Pupils are round and equally reacting to light. EOMI. No scleral icterus.

No conjunctival pallor. Normocephalic, atraumatic. No pharyngeal erythema. No 

thyromegaly. 


CARDIOVASCULAR: S1 and S2 present. No murmurs, rubs, or gallops. 


PULMONARY: Diminished breath sounds at bases bilaterally, tachypneic


ABDOMEN: Soft, nontender, nondistended, normoactive bowel sounds. No palpable 

organomegaly. 


MUSCULOSKELETAL: No joint swelling or deformity.


EXTREMITIES: No cyanosis, clubbing, or pedal edema. 


NEUROLOGICAL: Gross neurological examination did not reveal any focal deficits. 


SKIN: No rashes. 





Assessment and plan





acute CHF exacerbation.


Acute COPD exacerbation


Acute and chronic hypoxic respiratory failure


A. fib and RVR, present on admission, on Eliquis at home


moderate to severe tricuspid regurgitation and moderate to severe pulmonary 

hypertension


Chronic kidney disease stage III


Diabetes mellitus with Hypoglycemia and hyperglycemia more than 500 on 

admission,


Hyperlipidemia


Hypercalcemia


Mild transaminitis














Plan: 


Monitor vital signs


Monitor CBC


Continue oxygen supplementation


Continue BiPAP as needed


Continue IV Lasix 40 twice a day


Continue IV steroids and breathing treatments


DC Cardizem drip, start oral Cardizem 60 mg every 8 hourly.


Ordered FOBT.


Ordered repeat H&H


Follow-up on cardiology recommendations


Follow-up on pulmonary recommendations





DVT prophylaxis:





Objective





- Vital Signs


Vital signs: 


                                   Vital Signs











Temp  97.8 F   01/14/23 03:44


 


Pulse  80   01/14/23 11:51


 


Resp  22   01/14/23 08:40


 


BP  112/69   01/14/23 11:49


 


Pulse Ox  96   01/14/23 11:49


 


FiO2  50   01/14/23 11:49








                                 Intake & Output











 01/13/23 01/14/23 01/14/23





 18:59 06:59 18:59


 


Intake Total 202.500 59.75 240


 


Output Total   800


 


Balance 202.500 59.75 -560


 


Weight 62.596 kg 60.8 kg 60.8 kg


 


Intake:   


 


  Intake, IV Titration 22.500 59.75 





  Amount   


 


    Diltiazem 125 mg In 22.500 59.75 





    Sodium Chloride 0.9% 100   





    ml @ 10 MG/HR 10 mls/hr   





    IV .[id number] [facility] Rx#:   





    [id number]   


 


  Oral 180  240


 


Output:   


 


  Urine   800


 


Other:   


 


  Voiding Method   External Catheter


 


  # Voids  1 














- Labs


CBC & Chem 7: 


                                 01/14/23 05:24





                                 01/14/23 05:24


Labs: 


                  Abnormal Lab Results - Last 24 Hours (Table)











  01/13/23 01/13/23 01/13/23 Range/Units





  14:00 14:00 14:00 


 


WBC     (3.8-10.6)  k/uL


 


RBC     (3.80-5.40)  m/uL


 


Hgb     (11.4-16.0)  gm/dL


 


Hct     (34.0-46.0)  %


 


MCV     (80.0-100.0)  fL


 


Neutrophils #     (1.3-7.7)  k/uL


 


Lymphocytes #     (1.0-4.8)  k/uL


 


Sodium     (137-145)  mmol/L


 


BUN     (7-17)  mg/dL


 


Creatinine     (0.52-1.04)  mg/dL


 


Glucose     (74-99)  mg/dL


 


POC Glucose (mg/dL)     ()  mg/dL


 


Hemoglobin A1c   10.0 H   (0.0-6.0)  %


 


Plasma Lactic Acid Jesse    7.1 H*  (0.7-2.0)  mmol/L


 


Magnesium     (1.6-2.3)  mg/dL


 


Troponin I  0.044 H*    (0.000-0.034)  ng/mL














  01/13/23 01/13/23 01/13/23 Range/Units





  15:27 16:32 18:25 


 


WBC     (3.8-10.6)  k/uL


 


RBC     (3.80-5.40)  m/uL


 


Hgb     (11.4-16.0)  gm/dL


 


Hct     (34.0-46.0)  %


 


MCV     (80.0-100.0)  fL


 


Neutrophils #     (1.3-7.7)  k/uL


 


Lymphocytes #     (1.0-4.8)  k/uL


 


Sodium     (137-145)  mmol/L


 


BUN     (7-17)  mg/dL


 


Creatinine     (0.52-1.04)  mg/dL


 


Glucose     (74-99)  mg/dL


 


POC Glucose (mg/dL)  320 H  302 H   ()  mg/dL


 


Hemoglobin A1c     (0.0-6.0)  %


 


Plasma Lactic Acid Jesse    6.7 H*  (0.7-2.0)  mmol/L


 


Magnesium     (1.6-2.3)  mg/dL


 


Troponin I     (0.000-0.034)  ng/mL














  01/13/23 01/13/23 01/14/23 Range/Units





  20:05 21:44 01:23 


 


WBC     (3.8-10.6)  k/uL


 


RBC     (3.80-5.40)  m/uL


 


Hgb     (11.4-16.0)  gm/dL


 


Hct     (34.0-46.0)  %


 


MCV     (80.0-100.0)  fL


 


Neutrophils #     (1.3-7.7)  k/uL


 


Lymphocytes #     (1.0-4.8)  k/uL


 


Sodium     (137-145)  mmol/L


 


BUN     (7-17)  mg/dL


 


Creatinine     (0.52-1.04)  mg/dL


 


Glucose     (74-99)  mg/dL


 


POC Glucose (mg/dL)  222 H    ()  mg/dL


 


Hemoglobin A1c     (0.0-6.0)  %


 


Plasma Lactic Acid Jesse   7.2 H*  4.7 H*  (0.7-2.0)  mmol/L


 


Magnesium     (1.6-2.3)  mg/dL


 


Troponin I     (0.000-0.034)  ng/mL














  01/14/23 01/14/23 01/14/23 Range/Units





  05:24 05:24 06:12 


 


WBC  11.1 H    (3.8-10.6)  k/uL


 


RBC  2.63 L    (3.80-5.40)  m/uL


 


Hgb  8.5 L D    (11.4-16.0)  gm/dL


 


Hct  26.3 L    (34.0-46.0)  %


 


MCV  100.1 H    (80.0-100.0)  fL


 


Neutrophils #  10.0 H    (1.3-7.7)  k/uL


 


Lymphocytes #  0.6 L    (1.0-4.8)  k/uL


 


Sodium   131 L   (137-145)  mmol/L


 


BUN   19 H   (7-17)  mg/dL


 


Creatinine   1.10 H   (0.52-1.04)  mg/dL


 


Glucose   309 H   (74-99)  mg/dL


 


POC Glucose (mg/dL)    328 H  ()  mg/dL


 


Hemoglobin A1c     (0.0-6.0)  %


 


Plasma Lactic Acid Jesse     (0.7-2.0)  mmol/L


 


Magnesium   1.4 L   (1.6-2.3)  mg/dL


 


Troponin I     (0.000-0.034)  ng/mL














  01/14/23 Range/Units





  12:00 


 


WBC   (3.8-10.6)  k/uL


 


RBC   (3.80-5.40)  m/uL


 


Hgb   (11.4-16.0)  gm/dL


 


Hct   (34.0-46.0)  %


 


MCV   (80.0-100.0)  fL


 


Neutrophils #   (1.3-7.7)  k/uL


 


Lymphocytes #   (1.0-4.8)  k/uL


 


Sodium   (137-145)  mmol/L


 


BUN   (7-17)  mg/dL


 


Creatinine   (0.52-1.04)  mg/dL


 


Glucose   (74-99)  mg/dL


 


POC Glucose (mg/dL)  280 H  ()  mg/dL


 


Hemoglobin A1c   (0.0-6.0)  %


 


Plasma Lactic Acid Jesse   (0.7-2.0)  mmol/L


 


Magnesium   (1.6-2.3)  mg/dL


 


Troponin I   (0.000-0.034)  ng/mL








                      Microbiology - Last 24 Hours (Table)











 01/13/23 12:25 Urine Culture - Preliminary





 Urine,Voided

## 2023-01-14 NOTE — P.PN
Subjective


Progress Note Date: 01/14/23





This is a very pleasant 82-year-old female patient with a history of atrial 

fibrillation anticoagulated with Eliquis, diabetes mellitus, chronic obstructive

pulmonary disease requiring home oxygen and she normally wears 6 L at home.  She

was brought in for a 1 week history of worsening shortness of breath, chest 

tightness and palpitations and was found to be hypoxemic at 75% on 6 L nasal 

cannula.  She was also found to be in atrial fibrillation with a rapid 

ventricular response.  She apparently had not been taking her medications at 

home.  White count 13.4.  Hemoglobin 11.2.  .8.  Platelets 400.  Sodium 

135.  Potassium 4.9.  Bicarb 17.  BUN 15.  Creatinine 1.17.  Blood sugar 576.  

Lactic acid 7.1.  Anion gap 21.  AST 65.  ALT 60.  Troponin 0.044.  ProBNP 6830.

 Pro-calcitonin 0.09.  Urine with 4+ glucose trace ketones.  Coronavirus not 

detected.  Influenza screen negative.  Chest x-ray shows bibasilar left midlung 

linear airspace opacities.  No focal consolidation.  No pneumothorax.  She is 

seen today in consultation on the selective care unit.  She is currently sitting

up in bed.  Awake and alert.  She does appear short of breath.  She is using 

some accessory muscles.  Arterial blood gases on 100% FiO2 revealed a PaO2 of 

269, pCO2 32, pH 7.44.  She was initially on BiPAP 12/5 and down to 50% FiO2.  

She's been initiated on DuoNeb inhalations, Symbicort, IV Solu-Medrol.  Be sta

rted on Cardizem drip at 10 mg per hour.  She is on antibiotics in the form of 

ceftriaxone.  Anticoagulated with Eliquis.





The patient is seen today 01/14/2023 in follow-up in the selective care unit.  

She was again having increasing oxygen demands and had been titrated up to 15 L 

but still continues saturations in the 70s.  She was subsequently placed on 

BiPAP 12/5 and 60% FiO2 with O2 sat durations improved.  Currently at 97%.  Her 

FiO2 is dialed down to 40%.  She remains awake and alert.  Chest x-ray continues

to show airspace opacities projecting over the heart.  No evidence of 

pneumothorax, focal consolidation or pleural effusion.  Cardiomegaly.  Urine 

culture pending.  White count 11.1.  Hemoglobin 8.5.  .1.  Sodium 131.  

Potassium 4.4.  Bicarb 25.  BUN 19.  Creatinine 1.10.  Glucose 309.  Currently 

in a -560 ML balance.  She remains on Lasix 40 mg IV every 12 hours.  DuoNeb 

inhalations, Pulmicort and Perforomist inhalations, IV Solu-Medrol.  Antibiotics

in the form of ceftriaxone.  Anticoagulated with Eliquis.





Objective





- Vital Signs


Vital signs: 


                                   Vital Signs











Temp  97.8 F   01/14/23 03:44


 


Pulse  80   01/14/23 11:51


 


Resp  22   01/14/23 08:40


 


BP  112/69   01/14/23 11:49


 


Pulse Ox  96   01/14/23 11:49


 


FiO2  50   01/14/23 11:49








                                 Intake & Output











 01/13/23 01/14/23 01/14/23





 18:59 06:59 18:59


 


Intake Total 202.500 59.75 240


 


Output Total   800


 


Balance 202.500 59.75 -560


 


Weight 62.596 kg 60.8 kg 60.8 kg


 


Intake:   


 


  Intake, IV Titration 22.500 59.75 





  Amount   


 


    Diltiazem 125 mg In 22.500 59.75 





    Sodium Chloride 0.9% 100   





    ml @ 10 MG/HR 10 mls/hr   





    IV .[id number] [facility] Rx#:   





    [id number]   


 


  Oral 180  240


 


Output:   


 


  Urine   800


 


Other:   


 


  Voiding Method   External Catheter


 


  # Voids  1 














- Exam





GENERAL EXAM: Alert, 82-year-old female, on BiPAP 12/5 and 40% FiO2, fairly 

comfortable in no apparent distress.


HEAD: Normocephalic.


EYES: Normal reaction of pupils, equal size.


NOSE: Clear with pink turbinates.


THROAT: No erythema or exudates.


NECK: No masses, no JVD.


CHEST: No chest wall deformity.


LUNGS: Equal air entry with bilateral scattered rhonchi and end expiratory 

wheeze, diminished.


CVS: S1 and S2 normal with no audible murmur, irregular rhythm.


ABDOMEN: No hepatosplenomegaly, normal bowel sounds, no guarding or rigidity.


SPINE: No scoliosis or deformity


SKIN: No rashes


CENTRAL NERVOUS SYSTEM: No focal deficits, tone is normal in all 4 extremities.


EXTREMITIES: There is no peripheral edema.  No clubbing, no cyanosis.  

Peripheral pulses are intact.





- Labs


CBC & Chem 7: 


                                 01/14/23 05:24





                                 01/14/23 05:24


Labs: 


                  Abnormal Lab Results - Last 24 Hours (Table)











  01/13/23 01/13/23 01/13/23 Range/Units





  14:00 14:00 14:00 


 


WBC     (3.8-10.6)  k/uL


 


RBC     (3.80-5.40)  m/uL


 


Hgb     (11.4-16.0)  gm/dL


 


Hct     (34.0-46.0)  %


 


MCV     (80.0-100.0)  fL


 


Neutrophils #     (1.3-7.7)  k/uL


 


Lymphocytes #     (1.0-4.8)  k/uL


 


Sodium     (137-145)  mmol/L


 


BUN     (7-17)  mg/dL


 


Creatinine     (0.52-1.04)  mg/dL


 


Glucose     (74-99)  mg/dL


 


POC Glucose (mg/dL)     ()  mg/dL


 


Hemoglobin A1c   10.0 H   (0.0-6.0)  %


 


Plasma Lactic Acid Jesse    7.1 H*  (0.7-2.0)  mmol/L


 


Magnesium     (1.6-2.3)  mg/dL


 


Troponin I  0.044 H*    (0.000-0.034)  ng/mL














  01/13/23 01/13/23 01/13/23 Range/Units





  15:27 16:32 18:25 


 


WBC     (3.8-10.6)  k/uL


 


RBC     (3.80-5.40)  m/uL


 


Hgb     (11.4-16.0)  gm/dL


 


Hct     (34.0-46.0)  %


 


MCV     (80.0-100.0)  fL


 


Neutrophils #     (1.3-7.7)  k/uL


 


Lymphocytes #     (1.0-4.8)  k/uL


 


Sodium     (137-145)  mmol/L


 


BUN     (7-17)  mg/dL


 


Creatinine     (0.52-1.04)  mg/dL


 


Glucose     (74-99)  mg/dL


 


POC Glucose (mg/dL)  320 H  302 H   ()  mg/dL


 


Hemoglobin A1c     (0.0-6.0)  %


 


Plasma Lactic Acid Jesse    6.7 H*  (0.7-2.0)  mmol/L


 


Magnesium     (1.6-2.3)  mg/dL


 


Troponin I     (0.000-0.034)  ng/mL














  01/13/23 01/13/23 01/14/23 Range/Units





  20:05 21:44 01:23 


 


WBC     (3.8-10.6)  k/uL


 


RBC     (3.80-5.40)  m/uL


 


Hgb     (11.4-16.0)  gm/dL


 


Hct     (34.0-46.0)  %


 


MCV     (80.0-100.0)  fL


 


Neutrophils #     (1.3-7.7)  k/uL


 


Lymphocytes #     (1.0-4.8)  k/uL


 


Sodium     (137-145)  mmol/L


 


BUN     (7-17)  mg/dL


 


Creatinine     (0.52-1.04)  mg/dL


 


Glucose     (74-99)  mg/dL


 


POC Glucose (mg/dL)  222 H    ()  mg/dL


 


Hemoglobin A1c     (0.0-6.0)  %


 


Plasma Lactic Acid Jesse   7.2 H*  4.7 H*  (0.7-2.0)  mmol/L


 


Magnesium     (1.6-2.3)  mg/dL


 


Troponin I     (0.000-0.034)  ng/mL














  01/14/23 01/14/23 01/14/23 Range/Units





  05:24 05:24 06:12 


 


WBC  11.1 H    (3.8-10.6)  k/uL


 


RBC  2.63 L    (3.80-5.40)  m/uL


 


Hgb  8.5 L D    (11.4-16.0)  gm/dL


 


Hct  26.3 L    (34.0-46.0)  %


 


MCV  100.1 H    (80.0-100.0)  fL


 


Neutrophils #  10.0 H    (1.3-7.7)  k/uL


 


Lymphocytes #  0.6 L    (1.0-4.8)  k/uL


 


Sodium   131 L   (137-145)  mmol/L


 


BUN   19 H   (7-17)  mg/dL


 


Creatinine   1.10 H   (0.52-1.04)  mg/dL


 


Glucose   309 H   (74-99)  mg/dL


 


POC Glucose (mg/dL)    328 H  ()  mg/dL


 


Hemoglobin A1c     (0.0-6.0)  %


 


Plasma Lactic Acid Jesse     (0.7-2.0)  mmol/L


 


Magnesium   1.4 L   (1.6-2.3)  mg/dL


 


Troponin I     (0.000-0.034)  ng/mL














  01/14/23 Range/Units





  12:00 


 


WBC   (3.8-10.6)  k/uL


 


RBC   (3.80-5.40)  m/uL


 


Hgb   (11.4-16.0)  gm/dL


 


Hct   (34.0-46.0)  %


 


MCV   (80.0-100.0)  fL


 


Neutrophils #   (1.3-7.7)  k/uL


 


Lymphocytes #   (1.0-4.8)  k/uL


 


Sodium   (137-145)  mmol/L


 


BUN   (7-17)  mg/dL


 


Creatinine   (0.52-1.04)  mg/dL


 


Glucose   (74-99)  mg/dL


 


POC Glucose (mg/dL)  280 H  ()  mg/dL


 


Hemoglobin A1c   (0.0-6.0)  %


 


Plasma Lactic Acid Jesse   (0.7-2.0)  mmol/L


 


Magnesium   (1.6-2.3)  mg/dL


 


Troponin I   (0.000-0.034)  ng/mL








                      Microbiology - Last 24 Hours (Table)











 01/13/23 12:25 Urine Culture - Preliminary





 Urine,Voided 














Assessment and Plan


Assessment: 





Acute on chronic hypoxemic respiratory failure secondary to suspected acute 

exacerbation of diastolic congestive heart failure, acute exacerbation of COPD





Atrial fibrillation with rapid ventricular response, anticoagulated with 

Eliquis.  Currently on oral Cardizem





Troponin leak





Hyperglycemia with presenting blood sugar of 576 





Diabetes mellitus, type II





Mild transaminitis





Severe chronic obstructive pulmonary disease, oxygen dependent and on 6 L in the

outpatient setting.





History of heavy tobacco dependence however quit approximately 10 years ago





Hyperlipidemia





History of medication noncompliance





Plan:





The patient was seen and evaluated


Chest x-ray, labs and medications reviewed


Continue DuoNeb inhalations, IV Solu-Medrol, Symbicort


Continue Lasix 40 mg IV every 12 hours


Utilize BiPAP 12/5 with FiO2 40% as needed


Continue to titrate down the FiO2 as tolerated


Be accepting of O2 saturations 88% and higher


Condition remains guarded


DO NOT RESUSCITATE/DO NOT INTUBATE CODE STATUS


We will continue to follow 





I have personally seen and examined the patient, performed the documentation and

the assessment and plan as written.  Number of minutes spent on the visit: 10.

## 2023-01-14 NOTE — P.NPCON
History of Present Illness





- Reason for Consult


Consult date: 01/14/23


metabolic acidosis





- Chief Complaint


Shortness of breath





- History of Present Illness





This is an 82-year-old female seen in consultation because of lactic acidosis, 

acute kidney injury and chronic kidney disease.





She came in because of shortness of breath.  On admission, creatinine was 1.17 

bicarb was 17 with a gap of 21.  Lactic acid was 11.3 on admission with a blood 

sugar of 576.  Lactic acid is intact to 1.8.  And bicarbonate has improved to 19

with a gap of 8.





Admission EKG was suggestive of atrial fibrillation and a chest x-ray reported 

by basilar and left midlung linear airspace opacities likely represent 

atelectasis.





Currently patient is awake and alert on a CPAP machine.  Denies any complaint 

except for shortness of breath.  No chest pain no fever chills.





She is known with long-standing diabetes of more than 20 years.  Patient was on 

metformin.











Past Medical History


Past Medical History: COPD


History of Any Multi-Drug Resistant Organisms: None Reported


Past Surgical History: No Surgical Hx Reported


Past Anesthesia/Blood Transfusion Reactions: No Reported Reaction


Past Psychological History: No Psychological Hx Reported


Smoking Status: Former smoker


Past Alcohol Use History: None Reported


Past Drug Use History: None Reported





Medications and Allergies


                                Home Medications











 Medication  Instructions  Recorded  Confirmed  Type


 


Apixaban [Eliquis] 2.5 mg PO BID 12/14/22 01/13/23 History


 


Cyanocobalamin [Vitamin B-12 1,000 mcg SQ Q30D 12/14/22 01/13/23 History





Injection]    


 


Dapagliflozin Propanediol [Farxiga] 10 mg PO DAILY 12/14/22 01/13/23 History


 


Fluticasone Propion/Salmeterol 1 puff INHALATION RT-BID 12/14/22 01/13/23 

History





[Advair 250-50 Diskus]    


 


Ipratropium/Albuter 20-100Mcg 1 puff INHALATION RT-QID 12/14/22 01/13/23 History





[Combivent Respimat 20-100Mcg    





Inhaler]    


 


Semaglutide [Ozempic] 0.5 mg SQ TH 12/14/22 01/13/23 History


 


Acetaminophen Tab [Tylenol] 650 mg PO Q6HR PRN  tab 12/16/22 01/13/23 Rx


 


Atorvastatin [Lipitor] 40 mg PO DAILY 30 Days #30 tab 12/16/22 01/13/23 Rx


 


Docusate [Colace] 100 mg PO BID 30 Days #60 cap 12/16/22 01/13/23 Rx


 


Furosemide [Lasix] 20 mg PO DAILY 30 Days #30 tab 12/16/22 01/13/23 Rx


 


Ipratropium-Albuterol Nebulize 3 ml INHALATION RT-QID  each 12/16/22 01/13/23 Rx





[Duoneb 0.5 mg-3 mg/3 ml Soln]    


 


Ipratropium-Albuterol Nebulize 3 ml INHALATION RT-QID PRN  each 12/16/22 01/13/23 Rx





[Duoneb 0.5 mg-3 mg/3 ml Soln]    


 


Pantoprazole [Protonix] 40 mg PO AC-BRKFST 30 Days #30 tab 12/16/22 01/13/23 Rx


 


Potassium Chloride ER [K-Dur 10] 10 meq PO DAILY 01/13/23 01/13/23 History


 


metFORMIN HCL 1,000 mg PO BID 01/13/23 01/13/23 History








                                    Allergies











Allergy/AdvReac Type Severity Reaction Status Date / Time


 


No Known Allergies Allergy   Verified 01/13/23 09:27














Physical Exam


Vitals: 


                                   Vital Signs











  Temp Pulse Pulse Resp BP BP Pulse Ox


 


 01/14/23 08:40    85  22   


 


 01/14/23 08:00   80     


 


 01/14/23 07:48   83     


 


 01/14/23 07:44     23    91 L


 


 01/14/23 07:43    83    130/60  68 L


 


 01/14/23 07:34       


 


 01/14/23 04:14   76     


 


 01/14/23 04:06   76     


 


 01/14/23 03:44  97.8 F   79  22   105/55  92 L


 


 01/14/23 01:09        89 L


 


 01/14/23 01:08   76     


 


 01/14/23 01:04       


 


 01/14/23 00:58   80     


 


 01/13/23 23:27  97.7 F   77  22   102/50  92 L


 


 01/13/23 20:50   76     


 


 01/13/23 20:33   73      93 L


 


 01/13/23 19:44  97.5 F L   72  24   127/56  93 L


 


 01/13/23 17:50        91 L


 


 01/13/23 16:14   76     


 


 01/13/23 16:01   76     


 


 01/13/23 15:24    76    124/58  96


 


 01/13/23 14:35    73  20   


 


 01/13/23 12:41    72    105/66  93 L


 


 01/13/23 12:24  97.9 F  79   24  107/52   94 L


 


 01/13/23 11:22   78     


 


 01/13/23 11:11   70     


 


 01/13/23 11:05   71   26 H  102/47   93 L


 


 01/13/23 10:43        90 L


 


 01/13/23 10:42        78 L


 


 01/13/23 09:54   142 H   22  108/84   100














  FiO2


 


 01/14/23 08:40 


 


 01/14/23 08:00 


 


 01/14/23 07:48 


 


 01/14/23 07:44 


 


 01/14/23 07:43 


 


 01/14/23 07:34  60


 


 01/14/23 04:14 


 


 01/14/23 04:06  60


 


 01/14/23 03:44  60


 


 01/14/23 01:09  50


 


 01/14/23 01:08 


 


 01/14/23 01:04  60


 


 01/14/23 00:58 


 


 01/13/23 23:27 


 


 01/13/23 20:50 


 


 01/13/23 20:33 


 


 01/13/23 19:44 


 


 01/13/23 17:50 


 


 01/13/23 16:14 


 


 01/13/23 16:01 


 


 01/13/23 15:24 


 


 01/13/23 14:35 


 


 01/13/23 12:41 


 


 01/13/23 12:24 


 


 01/13/23 11:22 


 


 01/13/23 11:11 


 


 01/13/23 11:05 


 


 01/13/23 10:43 


 


 01/13/23 10:42 


 


 01/13/23 09:54 








                                Intake and Output











 01/13/23 01/14/23 01/14/23





 22:59 06:59 14:59


 


Intake Total 239.75  240


 


Balance 239.75  240


 


Intake:   


 


  Intake, IV Titration 59.75  





  Amount   


 


    Diltiazem 125 mg In 59.75  





    Sodium Chloride 0.9% 100   





    ml @ 10 MG/HR 10 mls/hr   





    IV .[id number] [facility] Rx#:   





    [id number]   


 


  Oral 180  240


 


Other:   


 


  Voiding Method   External Catheter


 


  # Voids  1 


 


  Weight  60.8 kg 








Awake alert and follows commands and is cooperative





She is on CPAP somewhat difficult to understand her.





No JVP noted neck is supple no facial asymmetry





Lungs are to give him for diminished breath sounds.  No crepitations heard





Heart sounds unremarkable although she is known with severe tricuspid regurg and

pulmonary hypertension





Abdomen soft nontender





Extremity exam was no edema





Neurologically awake alert but generalized weakness





Results





- Lab Results


                             Most recent lab results











ABG pH  7.44  (7.35-7.45)   01/13/23  10:15    


 


ABG pCO2  32 mmHg (35-45)  L  01/13/23  10:15    


 


ABG pO2  269 mmHg ()  H  01/13/23  10:15    


 


ABG HCO3  22 mmol/L (21-25)   01/13/23  10:15    


 


ABG O2 Saturation  100.0 % (94-97)  H  01/13/23  10:15    


 


Calcium  8.5 mg/dL (8.4-10.2)   01/14/23  05:24    


 


Magnesium  1.4 mg/dL (1.6-2.3)  L  01/14/23  05:24    














                                 01/14/23 05:24





                                 01/14/23 05:24





Assessment and Plan


Plan: 





Impression





1.  Admitted with shortness of breath and severe lactic acidosis possibly 

related to the metformin.





2.  Hyperglycemia on admission, blood sugar was 576





3.  Chronic kidney disease stage III nephrosclerosis.  Urinalysis shows no 

proteinuria on 01/13/2023 yesterday





4.  Mild acute kidney injury secondary to cardiorenal syndrome.





5.  Atrial fibrillation controlled ventricular response.





6.  Congestive heart failure currently on Lasix.





7.  Mild hyponatremia with correction for blood sugar nearly normal sodium is 

131 and blood sugar is 309.





8.  Anemia hemoglobin is 8.5 rule out iron deficiency





Recommendation





1.  Discontinue metformin because of the severe lactic acidosis





2.  Maintain diuretics for right now, currently on Lasix 40 IV every 12





3.  Watch sodium.





4.  Iron and TIBC.





Thank you for this consultation will continue close follow o'clock

## 2023-01-14 NOTE — P.CRDCN
History of Present Illness


Consult date: 01/14/23


History of present illness: 





Patient is an 82-year-old female with a known history of atrial 

fibrillation,severe chronic obstructive lung disease, on home O2 who stopped s

moking about a year ago.We will consult to see the patient for atrial 

fibrillation.  Patient initially presented to the ER.  EKG showed atrial 

fibrillation with rapid ventricular rate of 178.  Patient was started on 

Cardizem at 5 mg an hour and put on BiPAP.  While in the ER patient converted to

sinus rhythm.  She is on eliquis for anticoagulation.  Heart rate is controlled 

in the 70s to 80s.  Chest x-ray showed mild lung opacities likely atelectasis.  

Patient had an echocardiogram on 12/16/2022 which showed normal sinus rhythm 

with severe tricuspid regurgitation, severe pulmonary hypertension, right atrium

enlargement, and right ventricular dilation.  She is also currently being 

treated with IV antibiotics for UTI.  Will obtain 2-D echocardiogram.  Continue 

with eliquis





Review of Systems


REVIEW OF SYSTEMS


At the time of my exam:


CONSTITUTIONAL: Denies fever or chills.


EYES: Negative for vision changes


ENT: Negative for hearing loss


CARDIOVASCULAR: Denies chest pain, shortness of breath, diaphoresis, orthopnea, 

PND or palpitations.


VASCULAR: Denies edema


RESPIRATORY: Denies cough.  Initially complaining of shortness of breath


GASTROINTESTINAL: Denies abdominal pain, diarrhea, constipation, nausea or 

vomiting.


MUSCULOSKELETAL: Denies myalgias.


NEUROLOGIC: Denies numbness, tingling, headache or weakness.


ENDOCRINE: Denies fatigue, weight change,  polydipsia or polyurina.


GENITOURINARY: Denies burning, hematuria or urgency with micturation.


HEMATOLOGIC: Denies history of anemia or bleeding. 


DERMATOLOGY: Denies rash or skin sores


PSYCH: Negative for depression or hallucinations. 





Past Medical History


Past Medical History: COPD


History of Any Multi-Drug Resistant Organisms: None Reported


Past Surgical History: No Surgical Hx Reported


Past Anesthesia/Blood Transfusion Reactions: No Reported Reaction


Past Psychological History: No Psychological Hx Reported


Smoking Status: Former smoker


Past Alcohol Use History: None Reported


Past Drug Use History: None Reported





Medications and Allergies


                                Home Medications











 Medication  Instructions  Recorded  Confirmed  Type


 


Apixaban [Eliquis] 2.5 mg PO BID 12/14/22 01/13/23 History


 


Cyanocobalamin [Vitamin B-12 1,000 mcg SQ Q30D 12/14/22 01/13/23 History





Injection]    


 


Dapagliflozin Propanediol [Farxiga] 10 mg PO DAILY 12/14/22 01/13/23 History


 


Fluticasone Propion/Salmeterol 1 puff INHALATION RT-BID 12/14/22 01/13/23 

History





[Advair 250-50 Diskus]    


 


Ipratropium/Albuter 20-100Mcg 1 puff INHALATION RT-QID 12/14/22 01/13/23 History





[Combivent Respimat 20-100Mcg    





Inhaler]    


 


Semaglutide [Ozempic] 0.5 mg SQ TH 12/14/22 01/13/23 History


 


Acetaminophen Tab [Tylenol] 650 mg PO Q6HR PRN  tab 12/16/22 01/13/23 Rx


 


Atorvastatin [Lipitor] 40 mg PO DAILY 30 Days #30 tab 12/16/22 01/13/23 Rx


 


Docusate [Colace] 100 mg PO BID 30 Days #60 cap 12/16/22 01/13/23 Rx


 


Furosemide [Lasix] 20 mg PO DAILY 30 Days #30 tab 12/16/22 01/13/23 Rx


 


Ipratropium-Albuterol Nebulize 3 ml INHALATION RT-QID  each 12/16/22 01/13/23 Rx





[Duoneb 0.5 mg-3 mg/3 ml Soln]    


 


Ipratropium-Albuterol Nebulize 3 ml INHALATION RT-QID PRN  each 12/16/22 01/13/23 Rx





[Duoneb 0.5 mg-3 mg/3 ml Soln]    


 


Pantoprazole [Protonix] 40 mg PO AC-BRKFST 30 Days #30 tab 12/16/22 01/13/23 Rx


 


Potassium Chloride ER [K-Dur 10] 10 meq PO DAILY 01/13/23 01/13/23 History


 


metFORMIN HCL 1,000 mg PO BID 01/13/23 01/13/23 History








                                    Allergies











Allergy/AdvReac Type Severity Reaction Status Date / Time


 


No Known Allergies Allergy   Verified 01/13/23 09:27














Physical Exam


Vitals: 


                                   Vital Signs











  Temp Pulse Pulse Resp BP Pulse Ox FiO2


 


 01/14/23 11:51   80     


 


 01/14/23 11:49    68   112/69  96  50


 


 01/14/23 11:35   76      50


 


 01/14/23 08:40    85  22   


 


 01/14/23 08:00   80     


 


 01/14/23 07:48   83     


 


 01/14/23 07:44     23   91 L 


 


 01/14/23 07:43    83   130/60  68 L 


 


 01/14/23 07:34        60


 


 01/14/23 04:14   76     


 


 01/14/23 04:06   76      60


 


 01/14/23 03:44  97.8 F   79  22  105/55  92 L  60


 


 01/14/23 01:09       89 L  50


 


 01/14/23 01:08   76     


 


 01/14/23 01:04        60


 


 01/14/23 00:58   80     


 


 01/13/23 23:27  97.7 F   77  22  102/50  92 L 


 


 01/13/23 20:50   76     


 


 01/13/23 20:33   73     93 L 


 


 01/13/23 19:44  97.5 F L   72  24  127/56  93 L 


 


 01/13/23 17:50       91 L 


 


 01/13/23 16:14   76     


 


 01/13/23 16:01   76     


 


 01/13/23 15:24    76   124/58  96 


 


 01/13/23 14:35    73  20   


 


 01/13/23 12:41    72   105/66  93 L 








                                Intake and Output











 01/13/23 01/14/23 01/14/23





 22:59 06:59 14:59


 


Intake Total 239.75  240


 


Balance 239.75  240


 


Intake:   


 


  Intake, IV Titration 59.75  





  Amount   


 


    Diltiazem 125 mg In 59.75  





    Sodium Chloride 0.9% 100   





    ml @ 10 MG/HR 10 mls/hr   





    IV .[id number] [facility] Rx#:   





    [id number]   


 


  Oral 180  240


 


Other:   


 


  Voiding Method   External Catheter


 


  # Voids  1 


 


  Weight  60.8 kg 

















General:  The patient is awake and alert, in no distress, and does not appear 

acutely ill. 


Skin:  Skin is warm and dry and no rashes or lesions are noted. 


Eye:  Pupils are equal, round and reactive to light, extra-ocular movements are 

intact; there is normal conjunctiva bilaterally.  


Ears, nose, mouth and throat:  There are moist mucous membranes and no oral 

lesions. 


Neck:  The neck is supple, there is no tenderness  or JVD.  


Cardiovascular:  There is  regular rate and rhythm. No murmur, rub or gallop is 

appreciated.


Respiratory:  Lungs are clear to auscultation, respirations are non-labored, 

breath sounds are equal.  


Gastrointestinal:  Soft, non-distended, non-tender abdomen without masses or 

organomegaly noted. There is no rebound or guarding present. Bowel sounds are 

unremarkable. 


Back:  There is no tenderness to palpation in the midline. There is no obvious 

deformity.


Musculoskeletal:  Normal ROM, no tenderness, There is no pedal edema. There is 

no calf tenderness or swelling.  


Extremities: Mild bilateral pitting edema


Vascular: Femoral pulse is normal. Posterior tibial pulses are normal .Dorsalis 

pedis is palpable.


Neurological:  CN II-XII intact. There are no obvious motor or sensory deficits.

Speech is normal.


Psychiatric:  Cooperative, appropriate mood & affect, normal judgment





Results





                                 01/14/23 05:24





                                 01/14/23 05:24


                                 Cardiac Enzymes











  01/13/23 Range/Units





  14:00 


 


Troponin I  0.044 H*  (0.000-0.034)  ng/mL








                                       CBC











  01/14/23 Range/Units





  05:24 


 


WBC  11.1 H  (3.8-10.6)  k/uL


 


RBC  2.63 L  (3.80-5.40)  m/uL


 


Hgb  8.5 L D  (11.4-16.0)  gm/dL


 


Hct  26.3 L  (34.0-46.0)  %


 


Plt Count  247  (150-450)  k/uL








                          Comprehensive Metabolic Panel











  01/14/23 Range/Units





  05:24 


 


Sodium  131 L  (137-145)  mmol/L


 


Potassium  4.4  (3.5-5.1)  mmol/L


 


Chloride  98  ()  mmol/L


 


Carbon Dioxide  25  (22-30)  mmol/L


 


BUN  19 H  (7-17)  mg/dL


 


Creatinine  1.10 H  (0.52-1.04)  mg/dL


 


Glucose  309 H  (74-99)  mg/dL


 


Calcium  8.5  (8.4-10.2)  mg/dL








                               Current Medications











Generic Name Dose Route Start Last Admin





  Trade Name Freq  PRN Reason Stop Dose Admin


 


Acetaminophen  650 mg  01/13/23 11:00  01/13/23 23:02





  Acetaminophen Tab 325 Mg Tab  PO   650 mg





  Q6HR PRN   Administration





  Mild Pain or Fever > 100.5  


 


Albuterol/Ipratropium  3 ml  01/13/23 12:00  01/14/23 11:42





  Ipratropium-Albuterol 3 Ml Neb  INHALATION   3 ml





  RT-Q4H RADHA   Administration


 


Alprazolam  0.5 mg  01/13/23 12:16  01/13/23 20:15





  Alprazolam 0.5 Mg Tab  PO   0.5 mg





  QID PRN   Administration





  Anxiety  


 


Apixaban  2.5 mg  01/13/23 21:00  01/14/23 08:22





  Apixaban 2.5 Mg Tablet  PO   2.5 mg





  BID RADHA   Administration





  Protocol  


 


Atorvastatin Calcium  40 mg  01/14/23 09:00  01/14/23 08:22





  Atorvastatin 40 Mg Tab  PO   40 mg





  DAILY RADHA   Administration


 


Budesonide  1 mg  01/14/23 20:00 





  Budesonide 1 Mg/2 Ml Nebu  INHALATION  





  RT-BID RADHA  


 


Cyanocobalamin  1,000 mcg  01/23/23 09:00 





  Cyanocobalamin 1,000 Mcg/Ml 1 Ml Vial  SQ  





  Q30D RADHA  


 


Dapagliflozin  10 mg  01/14/23 09:00  01/14/23 08:22





  Dapagliflozin Propanediol 10 Mg Tablet  PO   10 mg





  DAILY RADHA   Administration


 


Dextrose/Water  25 ml  01/13/23 11:03 





  Dextrose 50% Syringe 50 Ml  IVP  





  PER PROTOCOL PRN  





  Hypoglycemia  





  Protocol  


 


Dextrose/Water  50 ml  01/13/23 11:03 





  Dextrose 50% Syringe 50 Ml  IVP  





  PER PROTOCOL PRN  





  Hypoglycemia  





  Protocol  


 


Diltiazem HCl  30 mg  01/14/23 16:00 





  Diltiazem Oral 30 Mg Tab  PO  





  Q8HR RADHA  


 


Docusate Sodium  100 mg  01/13/23 21:00  01/14/23 08:22





  Docusate 100 Mg Cap  PO   100 mg





  BID RADHA   Administration


 


Formoterol Fumarate  20 mcg  01/14/23 20:00 





  Formoterol Fumarate 20 Mcg/2 Ml Nebu  INHALATION  





  RT-BID RADHA  


 


Furosemide  40 mg  01/13/23 18:00  01/14/23 06:23





  Furosemide 10 Mg/Ml 4 Ml Vial  IV   40 mg





  Q12H RADHA   Administration


 


Ceftriaxone Sodium 1 gm/  50 mls @ 100 mls/hr  01/13/23 16:00  01/14/23 08:22





  Sodium Chloride  IVPB   100 mls/hr





  Q24HR RADHA   Administration





  Protocol  


 


Magnesium Sulfate/Dextrose 1  100 mls @ 100 mls/hr  01/14/23 11:30  01/14/23 

12:32





  gm/ IV Solution  IVPB  01/14/23 15:29  100 mls/hr





  Q1H RADHA   Administration


 


Insulin Aspart  0 unit  01/13/23 12:30  01/14/23 12:23





  Insulin Aspart (Novolog) 100 Unit/Ml Vial  SQ   6 unit





  ACHS RADHA   Administration





  Protocol  


 


Insulin Detemir  10 unit  01/13/23 16:00  01/14/23 06:22





  Insulin Detemir (Levemir) 100 Unit/Ml Syr  SQ   10 unit





  DAILY@0700 RADHA   Administration


 


Methylprednisolone Sodium Succinate  60 mg  01/14/23 12:00  01/14/23 11:30





  Methylprednisolone Sod Succi 125 Mg/2 Ml Vial  IV   60 mg





  Q6HR RADHA   Administration


 


Naloxone HCl  0.2 mg  01/13/23 08:54 





  Naloxone 0.4 Mg/Ml 1 Ml Vial  IV  





  Q2M PRN  





  Opioid Reversal  


 


Pantoprazole Sodium  40 mg  01/14/23 07:30  01/14/23 06:22





  Pantoprazole 40 Mg Tablet  PO   40 mg





  AC-BRKFST [facility]   Administration








                                Intake and Output











 01/13/23 01/14/23 01/14/23





 22:59 06:59 14:59


 


Intake Total 239.75  240


 


Balance 239.75  240


 


Intake:   


 


  Intake, IV Titration 59.75  





  Amount   


 


    Diltiazem 125 mg In 59.75  





    Sodium Chloride 0.9% 100   





    ml @ 10 MG/HR 10 mls/hr   





    IV .[id number] [facility] Rx#:   





    [id number]   


 


  Oral 180  240


 


Other:   


 


  Voiding Method   External Catheter


 


  # Voids  1 


 


  Weight  60.8 kg 








                                        





                                 01/14/23 05:24 





                                 01/14/23 05:24 











Assessment and Plan


Assessment: 





Persistent atrial fibrillation with RVR


Plan: 





Obtain a limited 2-D echocardiogram 


continue with oral anticoagulation


Continue with all other current cardiac medications


Continue with telemetry monitoring


Further recommendations based on clinical course





The above impression and plan of care have been discussed and directed by the 

signing physician. Elaine Howell, nurse practitioner, acting as scribe for 

signing physician.

## 2023-01-15 LAB
ALBUMIN SERPL-MCNC: 3.9 G/DL (ref 3.5–5)
ALP SERPL-CCNC: 92 U/L (ref 38–126)
ALT SERPL-CCNC: 35 U/L (ref 4–34)
ANION GAP SERPL CALC-SCNC: 10 MMOL/L
AST SERPL-CCNC: 32 U/L (ref 14–36)
BASOPHILS # BLD AUTO: 0 K/UL (ref 0–0.2)
BASOPHILS NFR BLD AUTO: 0 %
BUN SERPL-SCNC: 28 MG/DL (ref 7–17)
CALCIUM SPEC-MCNC: 9 MG/DL (ref 8.4–10.2)
CHLORIDE SERPL-SCNC: 96 MMOL/L (ref 98–107)
CO2 SERPL-SCNC: 31 MMOL/L (ref 22–30)
EOSINOPHIL # BLD AUTO: 0.1 K/UL (ref 0–0.7)
EOSINOPHIL NFR BLD AUTO: 1 %
ERYTHROCYTE [DISTWIDTH] IN BLOOD BY AUTOMATED COUNT: 3.02 M/UL (ref 3.8–5.4)
ERYTHROCYTE [DISTWIDTH] IN BLOOD: 15.4 % (ref 11.5–15.5)
GLUCOSE BLD-MCNC: 319 MG/DL (ref 70–110)
GLUCOSE BLD-MCNC: 371 MG/DL (ref 70–110)
GLUCOSE BLD-MCNC: 381 MG/DL (ref 70–110)
GLUCOSE BLD-MCNC: 387 MG/DL (ref 70–110)
GLUCOSE BLD-MCNC: 435 MG/DL (ref 70–110)
GLUCOSE SERPL-MCNC: 345 MG/DL (ref 74–99)
HCT VFR BLD AUTO: 30.8 % (ref 34–46)
HGB BLD-MCNC: 9.4 GM/DL (ref 11.4–16)
LYMPHOCYTES # SPEC AUTO: 0.4 K/UL (ref 1–4.8)
LYMPHOCYTES NFR SPEC AUTO: 2 %
MCH RBC QN AUTO: 31 PG (ref 25–35)
MCHC RBC AUTO-ENTMCNC: 30.4 G/DL (ref 31–37)
MCV RBC AUTO: 101.8 FL (ref 80–100)
MONOCYTES # BLD AUTO: 0.4 K/UL (ref 0–1)
MONOCYTES NFR BLD AUTO: 2 %
NEUTROPHILS # BLD AUTO: 16.9 K/UL (ref 1.3–7.7)
NEUTROPHILS NFR BLD AUTO: 94 %
PLATELET # BLD AUTO: 278 K/UL (ref 150–450)
POTASSIUM SERPL-SCNC: 3.6 MMOL/L (ref 3.5–5.1)
PROT SERPL-MCNC: 6.4 G/DL (ref 6.3–8.2)
SODIUM SERPL-SCNC: 137 MMOL/L (ref 137–145)
WBC # BLD AUTO: 17.9 K/UL (ref 3.8–10.6)

## 2023-01-15 RX ADMIN — IPRATROPIUM BROMIDE AND ALBUTEROL SULFATE SCH ML: .5; 3 SOLUTION RESPIRATORY (INHALATION) at 01:00

## 2023-01-15 RX ADMIN — FORMOTEROL FUMARATE DIHYDRATE SCH MCG: 20 SOLUTION RESPIRATORY (INHALATION) at 07:41

## 2023-01-15 RX ADMIN — DOCUSATE SODIUM SCH: 100 CAPSULE, LIQUID FILLED ORAL at 22:55

## 2023-01-15 RX ADMIN — IPRATROPIUM BROMIDE AND ALBUTEROL SULFATE SCH ML: .5; 3 SOLUTION RESPIRATORY (INHALATION) at 11:20

## 2023-01-15 RX ADMIN — DOCUSATE SODIUM SCH MG: 100 CAPSULE, LIQUID FILLED ORAL at 08:18

## 2023-01-15 RX ADMIN — ATORVASTATIN CALCIUM SCH MG: 40 TABLET, FILM COATED ORAL at 08:18

## 2023-01-15 RX ADMIN — INSULIN ASPART SCH UNIT: 100 INJECTION, SOLUTION INTRAVENOUS; SUBCUTANEOUS at 06:12

## 2023-01-15 RX ADMIN — METHYLPREDNISOLONE SODIUM SUCCINATE SCH MG: 125 INJECTION, POWDER, FOR SOLUTION INTRAMUSCULAR; INTRAVENOUS at 11:09

## 2023-01-15 RX ADMIN — BUDESONIDE SCH MG: 1 SUSPENSION RESPIRATORY (INHALATION) at 07:41

## 2023-01-15 RX ADMIN — DILTIAZEM HYDROCHLORIDE SCH MLS/HR: 5 INJECTION INTRAVENOUS at 19:03

## 2023-01-15 RX ADMIN — METHYLPREDNISOLONE SODIUM SUCCINATE SCH MG: 40 INJECTION, POWDER, FOR SOLUTION INTRAMUSCULAR; INTRAVENOUS at 16:37

## 2023-01-15 RX ADMIN — IPRATROPIUM BROMIDE AND ALBUTEROL SULFATE SCH ML: .5; 3 SOLUTION RESPIRATORY (INHALATION) at 04:50

## 2023-01-15 RX ADMIN — DILTIAZEM HYDROCHLORIDE SCH MG: 30 TABLET, FILM COATED ORAL at 08:18

## 2023-01-15 RX ADMIN — PANTOPRAZOLE SODIUM SCH MG: 40 TABLET, DELAYED RELEASE ORAL at 06:11

## 2023-01-15 RX ADMIN — APIXABAN SCH MG: 2.5 TABLET, FILM COATED ORAL at 08:18

## 2023-01-15 RX ADMIN — APIXABAN SCH MG: 2.5 TABLET, FILM COATED ORAL at 21:42

## 2023-01-15 RX ADMIN — INSULIN ASPART SCH UNIT: 100 INJECTION, SOLUTION INTRAVENOUS; SUBCUTANEOUS at 21:43

## 2023-01-15 RX ADMIN — SODIUM FERRIC GLUCONATE COMPLEX SCH MLS/HR: 12.5 INJECTION INTRAVENOUS at 11:32

## 2023-01-15 RX ADMIN — INSULIN ASPART SCH UNIT: 100 INJECTION, SOLUTION INTRAVENOUS; SUBCUTANEOUS at 16:59

## 2023-01-15 RX ADMIN — INSULIN DETEMIR SCH UNIT: 100 INJECTION, SOLUTION SUBCUTANEOUS at 21:42

## 2023-01-15 RX ADMIN — BUDESONIDE SCH MG: 1 SUSPENSION RESPIRATORY (INHALATION) at 19:38

## 2023-01-15 RX ADMIN — INSULIN ASPART SCH UNIT: 100 INJECTION, SOLUTION INTRAVENOUS; SUBCUTANEOUS at 11:55

## 2023-01-15 RX ADMIN — IPRATROPIUM BROMIDE AND ALBUTEROL SULFATE SCH ML: .5; 3 SOLUTION RESPIRATORY (INHALATION) at 15:38

## 2023-01-15 RX ADMIN — FORMOTEROL FUMARATE DIHYDRATE SCH MCG: 20 SOLUTION RESPIRATORY (INHALATION) at 19:38

## 2023-01-15 RX ADMIN — INSULIN ASPART SCH UNIT: 100 INJECTION, SOLUTION INTRAVENOUS; SUBCUTANEOUS at 16:52

## 2023-01-15 RX ADMIN — ACETAMINOPHEN PRN MG: 325 TABLET, FILM COATED ORAL at 17:32

## 2023-01-15 RX ADMIN — INSULIN DETEMIR SCH UNIT: 100 INJECTION, SOLUTION SUBCUTANEOUS at 06:12

## 2023-01-15 RX ADMIN — DAPAGLIFLOZIN SCH MG: 10 TABLET, FILM COATED ORAL at 08:18

## 2023-01-15 RX ADMIN — METHYLPREDNISOLONE SODIUM SUCCINATE SCH MG: 125 INJECTION, POWDER, FOR SOLUTION INTRAMUSCULAR; INTRAVENOUS at 06:12

## 2023-01-15 RX ADMIN — ACETAMINOPHEN PRN MG: 325 TABLET, FILM COATED ORAL at 11:31

## 2023-01-15 RX ADMIN — FUROSEMIDE SCH MG: 10 INJECTION, SOLUTION INTRAMUSCULAR; INTRAVENOUS at 06:11

## 2023-01-15 RX ADMIN — METHYLPREDNISOLONE SODIUM SUCCINATE SCH MG: 40 INJECTION, POWDER, FOR SOLUTION INTRAMUSCULAR; INTRAVENOUS at 22:59

## 2023-01-15 RX ADMIN — IPRATROPIUM BROMIDE AND ALBUTEROL SULFATE SCH ML: .5; 3 SOLUTION RESPIRATORY (INHALATION) at 07:41

## 2023-01-15 RX ADMIN — IPRATROPIUM BROMIDE AND ALBUTEROL SULFATE SCH ML: .5; 3 SOLUTION RESPIRATORY (INHALATION) at 19:38

## 2023-01-15 RX ADMIN — DILTIAZEM HYDROCHLORIDE SCH MG: 30 TABLET, FILM COATED ORAL at 16:37

## 2023-01-15 NOTE — P.PN
Subjective


Progress Note Date: 01/15/23


This is a pleasant 82 years old female with past medical history of COPD, she 

has also on 6 L of oxygen.


Presents because of worsening dyspnea over one week associated with occasional 

cough and no phlegm.


Patient has central chest pain, nonradiating for the last week, rated as 5/10 in

severity, nonradiating, nonspecific


Patient denies any genitourinary symptoms, no vomiting diarrhea or abdominal 

pain.  No dysuria or urgency.  She has some mild dizziness but no headache, 

weakness or numbness.


She denies smoking alcohol or illicit drugs.  Patient has history of smoking


She cannot remember who is her pulmonologist and she states she doesn't have a 

heart doctor.


Patient is found with A. fib and RVR and emergency room and currently she is on 

Cardizem drip at 10 mg per hour.


Patient is afebrile, she was tachycardic on admission with heart rate up to 190,

currently is 140s.  Patient multiple moderately tachypneic but she's not in 

significant respiratory distress.  Blood pressure 108/84, she is saturating 100%

on BiPAP with pressures of 12/5.


Patient has mild leukocytosis of 13.4, hemoglobin 11.2.  Rest of CBC, INR is 

unremarkable.


Sodium 135, creatinine 1.1


Glucose on admission was 61, currently fifth 176 on the BMP.


Mild hypercalcemia mostly secondary to dehydration, rule out other causes


AST mildly elevated 65 and ALT 60 while removing normal 0.9.  Troponin is 

negative.  ProBNP elevated 6840.


Influenza and covid nondetected


Echocardiogram done on done on 12/16/2022, showing ejection fraction of 55% with

moderate to severe tricuspid regurgitation and moderate to severe pulmonary 

hypertension


Chest x-ray: Vascular and left mid lung andopacity, mostly atelectasis.  

Reviewed the chest x-ray by myself, it shows prominent vascular markings but 

less evident that last month.  No evidence of consolidation.


Patient was started on Cardizem drip, received a bolus of 500 mL, Solu-Medrol 

105 and started on 40 mg.  Lower and insulin.





1/14


Patient seen and examined.  Currently on BiPAP.  States she feels better.  

Denies any blood in the stools.  Case discussed with nursing staff





1/15.  Patient seen and examined.  Continues to be on BiPAP.  States she feels 

better.  Vital signs stable








REVIEW OF SYSTEMS: 


CONSTITUTIONAL: No fever, no malaise,. 


CARDIOVASCULAR: No chest pain, no palpitations, no syncope. 


PULMONARY: no cough, 


GASTROINTESTINAL: No diarrhea, no nausea, no vomiting, no abdominal pain. 


NEUROLOGICAL: No headaches, no weakness, 





PHYSICAL EXAMINATION: 





GENERAL: The patient is alert and oriented x3, not in any acute distress. Well 

developed, well nourished. 


HEENT: Pupils are round and equally reacting to light. EOMI. No scleral icterus.

No conjunctival pallor. Normocephalic, atraumatic. No pharyngeal erythema. No 

thyromegaly. 


CARDIOVASCULAR: S1 and S2 present. No murmurs, rubs, or gallops. 


PULMONARY: Diminished breath sounds at bases bilaterally, tachypneic


ABDOMEN: Soft, nontender, nondistended, normoactive bowel sounds. No palpable 

organomegaly. 


MUSCULOSKELETAL: No joint swelling or deformity.


EXTREMITIES: No cyanosis, clubbing, or pedal edema. 


NEUROLOGICAL: Gross neurological examination did not reveal any focal deficits. 


SKIN: No rashes. 





Assessment and plan





acute CHF exacerbation.


Acute COPD exacerbation


Acute and chronic hypoxic respiratory failure


A. fib and RVR, present on admission, on Eliquis at home


moderate to severe tricuspid regurgitation and moderate to severe pulmonary 

hypertension


Chronic kidney disease stage III


Diabetes mellitus with Hypoglycemia and hyperglycemia more than 500 on admission

,


Hyperlipidemia


Hypercalcemia


Mild transaminitis





Plan: 


Monitor vital signs


Monitor CBC, hemoglobin improved to 9.4


Continue oxygen supplementation


Continue BiPAP as needed


Continue IV Lasix 40 twice a day


Continue IV Solu-Medrol


Continue oral Cardizem 60 mg every 8 hourly.


Follow-up on cardiology recommendations


Follow-up on pulmonary recommendations








Objective





- Vital Signs


Vital signs: 


                                   Vital Signs











Temp  97.8 F   01/15/23 03:14


 


Pulse  77   01/15/23 11:31


 


Resp  20   01/15/23 11:31


 


BP  105/61   01/15/23 11:02


 


Pulse Ox  93 L  01/15/23 11:02


 


FiO2  50   01/15/23 07:41








                                 Intake & Output











 01/14/23 01/15/23 01/15/23





 18:59 06:59 18:59


 


Intake Total 480  


 


Output Total 1350 1400 700


 


Balance -870 -1400 -700


 


Weight 60.8 kg 61 kg 


 


Intake:   


 


  Oral 480  


 


Output:   


 


  Urine 1350 1400 700


 


Other:   


 


  Voiding Method External Catheter External Catheter External Catheter














- Labs


CBC & Chem 7: 


                                 01/15/23 10:15





                                 01/15/23 10:15


Labs: 


                  Abnormal Lab Results - Last 24 Hours (Table)











  01/14/23 01/14/23 01/14/23 Range/Units





  05:24 14:37 16:55 


 


WBC   15.6 H   (3.8-10.6)  k/uL


 


RBC   2.67 L   (3.80-5.40)  m/uL


 


Hgb   8.4 L   (11.4-16.0)  gm/dL


 


Hct   26.9 L   (34.0-46.0)  %


 


MCV   100.8 H   (80.0-100.0)  fL


 


MCHC     (31.0-37.0)  g/dL


 


Neutrophils #     (1.3-7.7)  k/uL


 


Lymphocytes #     (1.0-4.8)  k/uL


 


Chloride     ()  mmol/L


 


Carbon Dioxide     (22-30)  mmol/L


 


BUN     (7-17)  mg/dL


 


Creatinine     (0.52-1.04)  mg/dL


 


Glucose     (74-99)  mg/dL


 


POC Glucose (mg/dL)    322 H  ()  mg/dL


 


Iron  30 L    ()  ug/dL


 


% Saturation  8.27 L    (12.00-45.00)   


 


ALT     (4-34)  U/L














  01/14/23 01/15/23 01/15/23 Range/Units





  19:51 06:01 10:15 


 


WBC     (3.8-10.6)  k/uL


 


RBC     (3.80-5.40)  m/uL


 


Hgb     (11.4-16.0)  gm/dL


 


Hct     (34.0-46.0)  %


 


MCV     (80.0-100.0)  fL


 


MCHC     (31.0-37.0)  g/dL


 


Neutrophils #     (1.3-7.7)  k/uL


 


Lymphocytes #     (1.0-4.8)  k/uL


 


Chloride    96 L  ()  mmol/L


 


Carbon Dioxide    31 H  (22-30)  mmol/L


 


BUN    28 H  (7-17)  mg/dL


 


Creatinine    1.23 H  (0.52-1.04)  mg/dL


 


Glucose    345 H  (74-99)  mg/dL


 


POC Glucose (mg/dL)  408 H  319 H   ()  mg/dL


 


Iron     ()  ug/dL


 


% Saturation     (12.00-45.00)   


 


ALT    35 H  (4-34)  U/L














  01/15/23 01/15/23 Range/Units





  10:15 11:47 


 


WBC  17.9 H   (3.8-10.6)  k/uL


 


RBC  3.02 L   (3.80-5.40)  m/uL


 


Hgb  9.4 L   (11.4-16.0)  gm/dL


 


Hct  30.8 L   (34.0-46.0)  %


 


MCV  101.8 H   (80.0-100.0)  fL


 


MCHC  30.4 L   (31.0-37.0)  g/dL


 


Neutrophils #  16.9 H   (1.3-7.7)  k/uL


 


Lymphocytes #  0.4 L   (1.0-4.8)  k/uL


 


Chloride    ()  mmol/L


 


Carbon Dioxide    (22-30)  mmol/L


 


BUN    (7-17)  mg/dL


 


Creatinine    (0.52-1.04)  mg/dL


 


Glucose    (74-99)  mg/dL


 


POC Glucose (mg/dL)   381 H  ()  mg/dL


 


Iron    ()  ug/dL


 


% Saturation    (12.00-45.00)   


 


ALT    (4-34)  U/L








                      Microbiology - Last 24 Hours (Table)











 01/13/23 12:25 Urine Culture - Final





 Urine,Voided

## 2023-01-15 NOTE — P.PN
Subjective


Progress Note Date: 01/15/23


Principal diagnosis: 





This is an 82-year-old female seen in consultation  with severe lactic acidosis,

9.4 on admission, hyperglycemia blood sugar was 576.  Metformin was possibly the

cause of her lactic acidosis.  Additionally she has chronic kidney disease stage

III with benign urinalysis and no proteinuria.





Additionally she had mild hyponatremia which was nearly normal when corrected by

the high blood sugar.





Hemoglobin is 8.5.





A chest x-ray was suggestive of pneumonia.





She was continued on diuretics, and metformin was discontinued blood pressure is

120/75, 24-hour intake is 480 and output 2750 area.  Labs this morning are 

pending





Her creatinine is stable at 1.1 as of yesterday.





She remains on the CPAP and desaturates off of it and she does use oxygen at 

home nasal cannula.  She is cc feeling much better





Objective





- Vital Signs


Vital signs: 


                                   Vital Signs











Temp  97.8 F   01/15/23 03:14


 


Pulse  92   01/15/23 08:24


 


Resp  21   01/15/23 08:24


 


BP  120/75   01/15/23 08:14


 


Pulse Ox  91 L  01/15/23 08:14


 


FiO2  50   01/15/23 07:41








                                 Intake & Output











 01/14/23 01/15/23 01/15/23





 18:59 06:59 18:59


 


Intake Total 480  


 


Output Total 1350 1400 700


 


Balance -870 -1400 -700


 


Weight 60.8 kg 61 kg 


 


Intake:   


 


  Oral 480  


 


Output:   


 


  Urine 1350 1400 700


 


Other:   


 


  Voiding Method External Catheter External Catheter External Catheter








On examination on CPAP





HEENT exam difficult but no JVP noted neck is supple no facial asymmetry





Lungs are significant for occasional coarse crackle and diminished air entry





Heart sounds unremarkable





Abdomen soft nontender





Extremity exam is reveals trace edema





Neurologically awake alert oriented





- Labs


CBC & Chem 7: 


                                 01/14/23 14:37





                                 01/14/23 05:24


Labs: 


                  Abnormal Lab Results - Last 24 Hours (Table)











  01/14/23 01/14/23 01/14/23 Range/Units





  05:24 12:00 14:37 


 


WBC    15.6 H  (3.8-10.6)  k/uL


 


RBC    2.67 L  (3.80-5.40)  m/uL


 


Hgb    8.4 L  (11.4-16.0)  gm/dL


 


Hct    26.9 L  (34.0-46.0)  %


 


MCV    100.8 H  (80.0-100.0)  fL


 


POC Glucose (mg/dL)   280 H   ()  mg/dL


 


Iron  30 L    ()  ug/dL


 


% Saturation  8.27 L    (12.00-45.00)   














  01/14/23 01/14/23 01/15/23 Range/Units





  16:55 19:51 06:01 


 


WBC     (3.8-10.6)  k/uL


 


RBC     (3.80-5.40)  m/uL


 


Hgb     (11.4-16.0)  gm/dL


 


Hct     (34.0-46.0)  %


 


MCV     (80.0-100.0)  fL


 


POC Glucose (mg/dL)  322 H  408 H  319 H  ()  mg/dL


 


Iron     ()  ug/dL


 


% Saturation     (12.00-45.00)   








                      Microbiology - Last 24 Hours (Table)











 01/13/23 12:25 Urine Culture - Final





 Urine,Voided 














Assessment and Plan


Plan: 





Impression





1.  Admitted with shortness of breath and severe lactic acidosis possibly 

related to the metformin.  Resolved with lactic acid being 1.8 and his bicarb is

25.  Anion gap 8





2.  Hyperglycemia on admission, blood sugar was 576.  Blood sugars remain high 

300-400





3.  Chronic kidney disease stage II- III nephrosclerosis.  Urinalysis shows no 

proteinuria on 01/13/2023 yesterday.  Creatinine 1.1





4.  Mild acute kidney injury secondary to cardiorenal syndrome.





5.  Atrial fibrillation controlled ventricular response.





6.  Congestive heart failure currently on Lasix.





7.  Mild hyponatremia with correction for blood sugar nearly normal sodium is 

131 and blood sugar is 309.





8.  Anemia hemoglobin dropped from 11.2 on 01/13/2023, 28.4 on 01/14/2023





9.  Iron deficiency, saturation 80% on 01/14/2023





Recommendation





1.  Avoid using metformin because of the severe lactic acidosis 





2.  Maintain diuretics for right now, currently on Lasix 40 IV every 12





3.  Watch sodium.





4. IV Ferrlecit 125 mg daily for 3 doses 





 5.  Watch for GI bleed





Thank you for this consultation will continue close follow o'clock

## 2023-01-15 NOTE — P.PN
Subjective


Progress Note Date: 01/15/23





Patient is seen today resting comfortably in bed in no signs of acute distress 

doing well.  She denies increased chest pain or pressure.  Her shortness of 

breath has improved.  She is currently on a high flow nasal cannula at 15 L and 

uses a BiPAP as needed.  She remains sinus rhythm on the monitor with a 

controlled ventricle rate in the 70s.  She is on oral Cardizem and Eliquis. .  

Her echocardiogram showed a normal LV function with severe pulmonary 

hypertension and moderate to severe tricuspid regurgitation, mild-to-moderate 

mitral regurgitation.





Objective





- Vital Signs


Vital signs: 


                                   Vital Signs











Temp  97.8 F   01/15/23 03:14


 


Pulse  77   01/15/23 11:31


 


Resp  20   01/15/23 11:31


 


BP  105/61   01/15/23 11:02


 


Pulse Ox  93 L  01/15/23 11:02


 


FiO2  50   01/15/23 07:41








                                 Intake & Output











 01/14/23 01/15/23 01/15/23





 18:59 06:59 18:59


 


Intake Total 480  


 


Output Total 1350 1400 700


 


Balance -870 -1400 -700


 


Weight 60.8 kg 61 kg 


 


Intake:   


 


  Oral 480  


 


Output:   


 


  Urine 1350 1400 700


 


Other:   


 


  Voiding Method External Catheter External Catheter External Catheter














- Exam





PHYSICAL EXAM: 


VITAL SIGNS: Reviewed.


GENERAL: Well-developed in no acute distress. 


HEENT: Head is normocephalic. Pupils are equal, round. Sclerae anicteric. Mucous

membranes of the mouth are moist. 


NECK: Supple. No JVD or thyromegaly


RESPIRATORY: Respirations even and unlabored. Lungs diminished to auscultation 

bilaterally.  On 15 L high flow


CARDIO: Regular rate and rhythm.  S1 and S2 heard.  Murmur noted 


EXTREMITIES: Normal range of motion.  No clubbing or cyanosis.  Peripheral 

pulses intact.  Negative for bilateral lower extremity edema


NEURO: Orientated to person, time, mood is appropriate





- Labs


CBC & Chem 7: 


                                 01/15/23 10:15





                                 01/15/23 10:15


Labs: 


                  Abnormal Lab Results - Last 24 Hours (Table)











  01/14/23 01/14/23 01/14/23 Range/Units





  05:24 14:37 16:55 


 


WBC   15.6 H   (3.8-10.6)  k/uL


 


RBC   2.67 L   (3.80-5.40)  m/uL


 


Hgb   8.4 L   (11.4-16.0)  gm/dL


 


Hct   26.9 L   (34.0-46.0)  %


 


MCV   100.8 H   (80.0-100.0)  fL


 


MCHC     (31.0-37.0)  g/dL


 


Neutrophils #     (1.3-7.7)  k/uL


 


Lymphocytes #     (1.0-4.8)  k/uL


 


Chloride     ()  mmol/L


 


Carbon Dioxide     (22-30)  mmol/L


 


BUN     (7-17)  mg/dL


 


Creatinine     (0.52-1.04)  mg/dL


 


Glucose     (74-99)  mg/dL


 


POC Glucose (mg/dL)    322 H  ()  mg/dL


 


Iron  30 L    ()  ug/dL


 


% Saturation  8.27 L    (12.00-45.00)   


 


ALT     (4-34)  U/L














  01/14/23 01/15/23 01/15/23 Range/Units





  19:51 06:01 10:15 


 


WBC     (3.8-10.6)  k/uL


 


RBC     (3.80-5.40)  m/uL


 


Hgb     (11.4-16.0)  gm/dL


 


Hct     (34.0-46.0)  %


 


MCV     (80.0-100.0)  fL


 


MCHC     (31.0-37.0)  g/dL


 


Neutrophils #     (1.3-7.7)  k/uL


 


Lymphocytes #     (1.0-4.8)  k/uL


 


Chloride    96 L  ()  mmol/L


 


Carbon Dioxide    31 H  (22-30)  mmol/L


 


BUN    28 H  (7-17)  mg/dL


 


Creatinine    1.23 H  (0.52-1.04)  mg/dL


 


Glucose    345 H  (74-99)  mg/dL


 


POC Glucose (mg/dL)  408 H  319 H   ()  mg/dL


 


Iron     ()  ug/dL


 


% Saturation     (12.00-45.00)   


 


ALT    35 H  (4-34)  U/L














  01/15/23 01/15/23 Range/Units





  10:15 11:47 


 


WBC  17.9 H   (3.8-10.6)  k/uL


 


RBC  3.02 L   (3.80-5.40)  m/uL


 


Hgb  9.4 L   (11.4-16.0)  gm/dL


 


Hct  30.8 L   (34.0-46.0)  %


 


MCV  101.8 H   (80.0-100.0)  fL


 


MCHC  30.4 L   (31.0-37.0)  g/dL


 


Neutrophils #  16.9 H   (1.3-7.7)  k/uL


 


Lymphocytes #  0.4 L   (1.0-4.8)  k/uL


 


Chloride    ()  mmol/L


 


Carbon Dioxide    (22-30)  mmol/L


 


BUN    (7-17)  mg/dL


 


Creatinine    (0.52-1.04)  mg/dL


 


Glucose    (74-99)  mg/dL


 


POC Glucose (mg/dL)   381 H  ()  mg/dL


 


Iron    ()  ug/dL


 


% Saturation    (12.00-45.00)   


 


ALT    (4-34)  U/L








                      Microbiology - Last 24 Hours (Table)











 01/13/23 12:25 Urine Culture - Final





 Urine,Voided 














Assessment and Plan


Assessment: 





Persistent atrial fibrillation with RVR


Plan: 





Obtain and reviewed  limited 2-D echocardiogram 


continue with oral anticoagulation


Continue with all other current cardiac medications


Continue with telemetry monitoring


Further recommendations based on clinical course





The above impression and plan of care have been discussed and directed by the 

signing physician. Elaine Howell, nurse practitioner, acting as scribe for 

signing physician.

## 2023-01-15 NOTE — P.PN
Subjective


Progress Note Date: 01/15/23


Principal diagnosis: 





Acute on chronic hypoxic respiratory failure secondary to acute COPD 

exacerbation and suspect acute exacerbation of diastolic congestive heart 

failure





This is a very pleasant 82-year-old female patient with a history of atrial 

fibrillation anticoagulated with Eliquis, diabetes mellitus, chronic obstructive

pulmonary disease requiring home oxygen and she normally wears 6 L at home.  She

was brought in for a 1 week history of worsening shortness of breath, chest 

tightness and palpitations and was found to be hypoxemic at 75% on 6 L nasal 

cannula.  She was also found to be in atrial fibrillation with a rapid 

ventricular response.  She apparently had not been taking her medications at 

home.  White count 13.4.  Hemoglobin 11.2.  .8.  Platelets 400.  Sodium 

135.  Potassium 4.9.  Bicarb 17.  BUN 15.  Creatinine 1.17.  Blood sugar 576.  

Lactic acid 7.1.  Anion gap 21.  AST 65.  ALT 60.  Troponin 0.044.  ProBNP 6830.

 Pro-calcitonin 0.09.  Urine with 4+ glucose trace ketones.  Coronavirus not 

detected.  Influenza screen negative.  Chest x-ray shows bibasilar left midlung 

linear airspace opacities.  No focal consolidation.  No pneumothorax.  She is 

seen today in consultation on the selective care unit.  She is currently sitting

up in bed.  Awake and alert.  She does appear short of breath.  She is using 

some accessory muscles.  Arterial blood gases on 100% FiO2 revealed a PaO2 of 

269, pCO2 32, pH 7.44.  She was initially on BiPAP 12/5 and down to 50% FiO2.  

She's been initiated on DuoNeb inhalations, Symbicort, IV Solu-Medrol.  Be star

kevin on Cardizem drip at 10 mg per hour.  She is on antibiotics in the form of 

ceftriaxone.  Anticoagulated with Eliquis.





The patient is seen today 01/14/2023 in follow-up in the selective care unit.  

She was again having increasing oxygen demands and had been titrated up to 15 L 

but still continues saturations in the 70s.  She was subsequently placed on 

BiPAP 12/5 and 60% FiO2 with O2 sat durations improved.  Currently at 97%.  Her 

FiO2 is dialed down to 40%.  She remains awake and alert.  Chest x-ray continues

to show airspace opacities projecting over the heart.  No evidence of 

pneumothorax, focal consolidation or pleural effusion.  Cardiomegaly.  Urine 

culture pending.  White count 11.1.  Hemoglobin 8.5.  .1.  Sodium 131.  

Potassium 4.4.  Bicarb 25.  BUN 19.  Creatinine 1.10.  Glucose 309.  Currently 

in a -560 ML balance.  She remains on Lasix 40 mg IV every 12 hours.  DuoNeb 

inhalations, Pulmicort and Perforomist inhalations, IV Solu-Medrol.  Antibiotics

in the form of ceftriaxone.  Anticoagulated with Eliquis.





Reevaluated today on 1/15/2023, patient remains borderline, continues to have 

BiPAP most of the time, today I am recommending a high flow nasal cannula to be 

used instead of BiPAP.  She is on 12/5/50%, and she is maintaining her 

saturations well, however off BiPAP she risks down to the 70s.  Remains on 

bronchodilators, remains on steroids, remains on antibiotics, and she is also on

insulin, still receiving Lasix 40 mg every 12 hours a creatinine is rising hence

I will cut down the Lasix to 40 mg daily instead of twice a day.  Remains on 

Solu-Medrol 60 mg IV push every 6 hours, however her blood sugars are 

consistently high, and I'm cutting down the prednisone to 40 mg IV push every 8 

hours











Objective





- Vital Signs


Vital signs: 


                                   Vital Signs











Temp  97.8 F   01/15/23 03:14


 


Pulse  77   01/15/23 11:31


 


Resp  20   01/15/23 11:31


 


BP  105/61   01/15/23 11:02


 


Pulse Ox  93 L  01/15/23 11:02


 


FiO2  50   01/15/23 07:41








                                 Intake & Output











 01/14/23 01/15/23 01/15/23





 18:59 06:59 18:59


 


Intake Total 480  


 


Output Total 1350 1400 700


 


Balance -870 -1400 -700


 


Weight 60.8 kg 61 kg 


 


Intake:   


 


  Oral 480  


 


Output:   


 


  Urine 1350 1400 700


 


Other:   


 


  Voiding Method External Catheter External Catheter External Catheter














- Exam





Physical Exam: Revealed 82-year-old female in no distress however she is on 

BiPAP.


Head: Atraumatic, normocephalic.


HEENT:[Neck is supple.] [No neck masses.] [No thyromegaly.] [No JVD.]


Chest: [Diminished breath sound bilaterally no rhonchi and no wheezes


Cardiac Exam: Irregular irregular rhythm.  [Normal S1 and S2, no S3 gallop, 2/6 

systolic murmur thought the precordium


Abdomen: [Soft, nontender,  no megaly, no rebound, no guarding, normal bowel 

sounds.]


Extremities: [No clubbing, no edema, no cyanosis.]


Neurological Exam: [No focal neurologic deficit.]  Alert oriented 3.


Psychiatric: Normal mood affect and normal mental status exam.


Skin: No rash





- Labs


CBC & Chem 7: 


                                 01/15/23 10:15





                                 01/15/23 10:15


Labs: 


                  Abnormal Lab Results - Last 24 Hours (Table)











  01/14/23 01/14/23 01/14/23 Range/Units





  05:24 14:37 16:55 


 


WBC   15.6 H   (3.8-10.6)  k/uL


 


RBC   2.67 L   (3.80-5.40)  m/uL


 


Hgb   8.4 L   (11.4-16.0)  gm/dL


 


Hct   26.9 L   (34.0-46.0)  %


 


MCV   100.8 H   (80.0-100.0)  fL


 


MCHC     (31.0-37.0)  g/dL


 


Neutrophils #     (1.3-7.7)  k/uL


 


Lymphocytes #     (1.0-4.8)  k/uL


 


Chloride     ()  mmol/L


 


Carbon Dioxide     (22-30)  mmol/L


 


BUN     (7-17)  mg/dL


 


Creatinine     (0.52-1.04)  mg/dL


 


Glucose     (74-99)  mg/dL


 


POC Glucose (mg/dL)    322 H  ()  mg/dL


 


Iron  30 L    ()  ug/dL


 


% Saturation  8.27 L    (12.00-45.00)   


 


ALT     (4-34)  U/L














  01/14/23 01/15/23 01/15/23 Range/Units





  19:51 06:01 10:15 


 


WBC     (3.8-10.6)  k/uL


 


RBC     (3.80-5.40)  m/uL


 


Hgb     (11.4-16.0)  gm/dL


 


Hct     (34.0-46.0)  %


 


MCV     (80.0-100.0)  fL


 


MCHC     (31.0-37.0)  g/dL


 


Neutrophils #     (1.3-7.7)  k/uL


 


Lymphocytes #     (1.0-4.8)  k/uL


 


Chloride    96 L  ()  mmol/L


 


Carbon Dioxide    31 H  (22-30)  mmol/L


 


BUN    28 H  (7-17)  mg/dL


 


Creatinine    1.23 H  (0.52-1.04)  mg/dL


 


Glucose    345 H  (74-99)  mg/dL


 


POC Glucose (mg/dL)  408 H  319 H   ()  mg/dL


 


Iron     ()  ug/dL


 


% Saturation     (12.00-45.00)   


 


ALT    35 H  (4-34)  U/L














  01/15/23 01/15/23 Range/Units





  10:15 11:47 


 


WBC  17.9 H   (3.8-10.6)  k/uL


 


RBC  3.02 L   (3.80-5.40)  m/uL


 


Hgb  9.4 L   (11.4-16.0)  gm/dL


 


Hct  30.8 L   (34.0-46.0)  %


 


MCV  101.8 H   (80.0-100.0)  fL


 


MCHC  30.4 L   (31.0-37.0)  g/dL


 


Neutrophils #  16.9 H   (1.3-7.7)  k/uL


 


Lymphocytes #  0.4 L   (1.0-4.8)  k/uL


 


Chloride    ()  mmol/L


 


Carbon Dioxide    (22-30)  mmol/L


 


BUN    (7-17)  mg/dL


 


Creatinine    (0.52-1.04)  mg/dL


 


Glucose    (74-99)  mg/dL


 


POC Glucose (mg/dL)   381 H  ()  mg/dL


 


Iron    ()  ug/dL


 


% Saturation    (12.00-45.00)   


 


ALT    (4-34)  U/L








                      Microbiology - Last 24 Hours (Table)











 01/13/23 12:25 Urine Culture - Final





 Urine,Voided 














Assessment and Plan


Assessment: 


Acute on chronic hypoxemic respiratory failure secondary to suspected acute 

exacerbation of diastolic congestive heart failure, acute exacerbation of COPD


Atrial fibrillation with rapid ventricular response, anticoagulated with 

Eliquis.  Currently on oral Cardizem


Troponin leak


Hyperglycemia with presenting blood sugar of 576 


Diabetes mellitus, type II


Mild transaminitis


Severe chronic obstructive pulmonary disease, oxygen dependent and on 6 L in the

outpatient setting.


History of heavy tobacco dependence however quit approximately 10 years ago


Hyperlipidemia


History of medication noncompliance





[facility]:


[facility]


Change Solu-Medrol to 40 mg IV push every 8 hours


Transitioned from BiPAP to high flow nasal cannula


Not quite ready for discharge planning


Condition remains guarded


DO NOT RESUSCITATE CODE STATUS.











Time with Patient: Less than 30

## 2023-01-16 LAB
ALBUMIN SERPL-MCNC: 3.3 G/DL (ref 3.5–5)
ALP SERPL-CCNC: 86 U/L (ref 38–126)
ALT SERPL-CCNC: 47 U/L (ref 4–34)
ANION GAP SERPL CALC-SCNC: 5 MMOL/L
AST SERPL-CCNC: 43 U/L (ref 14–36)
BASOPHILS # BLD AUTO: 0 K/UL (ref 0–0.2)
BASOPHILS NFR BLD AUTO: 0 %
BUN SERPL-SCNC: 31 MG/DL (ref 7–17)
CALCIUM SPEC-MCNC: 9.1 MG/DL (ref 8.4–10.2)
CHLORIDE SERPL-SCNC: 96 MMOL/L (ref 98–107)
CO2 SERPL-SCNC: 34 MMOL/L (ref 22–30)
EOSINOPHIL # BLD AUTO: 0 K/UL (ref 0–0.7)
EOSINOPHIL NFR BLD AUTO: 0 %
ERYTHROCYTE [DISTWIDTH] IN BLOOD BY AUTOMATED COUNT: 2.7 M/UL (ref 3.8–5.4)
ERYTHROCYTE [DISTWIDTH] IN BLOOD: 15 % (ref 11.5–15.5)
GLUCOSE BLD-MCNC: 267 MG/DL (ref 70–110)
GLUCOSE BLD-MCNC: 330 MG/DL (ref 70–110)
GLUCOSE BLD-MCNC: 366 MG/DL (ref 70–110)
GLUCOSE BLD-MCNC: 412 MG/DL (ref 70–110)
GLUCOSE BLD-MCNC: 596 MG/DL (ref 70–110)
GLUCOSE SERPL-MCNC: 315 MG/DL (ref 74–99)
HCT VFR BLD AUTO: 26.8 % (ref 34–46)
HGB BLD-MCNC: 8.4 GM/DL (ref 11.4–16)
LYMPHOCYTES # SPEC AUTO: 0.4 K/UL (ref 1–4.8)
LYMPHOCYTES NFR SPEC AUTO: 4 %
MCH RBC QN AUTO: 31.1 PG (ref 25–35)
MCHC RBC AUTO-ENTMCNC: 31.4 G/DL (ref 31–37)
MCV RBC AUTO: 99.2 FL (ref 80–100)
MONOCYTES # BLD AUTO: 0.3 K/UL (ref 0–1)
MONOCYTES NFR BLD AUTO: 3 %
NEUTROPHILS # BLD AUTO: 10.6 K/UL (ref 1.3–7.7)
NEUTROPHILS NFR BLD AUTO: 93 %
PLATELET # BLD AUTO: 238 K/UL (ref 150–450)
POTASSIUM SERPL-SCNC: 4 MMOL/L (ref 3.5–5.1)
PROT SERPL-MCNC: 5.6 G/DL (ref 6.3–8.2)
SODIUM SERPL-SCNC: 135 MMOL/L (ref 137–145)
WBC # BLD AUTO: 11.4 K/UL (ref 3.8–10.6)

## 2023-01-16 RX ADMIN — IPRATROPIUM BROMIDE AND ALBUTEROL SULFATE SCH ML: .5; 3 SOLUTION RESPIRATORY (INHALATION) at 15:33

## 2023-01-16 RX ADMIN — ACETAMINOPHEN PRN MG: 325 TABLET, FILM COATED ORAL at 08:58

## 2023-01-16 RX ADMIN — SODIUM FERRIC GLUCONATE COMPLEX SCH MLS/HR: 12.5 INJECTION INTRAVENOUS at 09:57

## 2023-01-16 RX ADMIN — APIXABAN SCH MG: 2.5 TABLET, FILM COATED ORAL at 08:57

## 2023-01-16 RX ADMIN — PANTOPRAZOLE SODIUM SCH MG: 40 TABLET, DELAYED RELEASE ORAL at 06:56

## 2023-01-16 RX ADMIN — FUROSEMIDE SCH MG: 10 INJECTION, SOLUTION INTRAMUSCULAR; INTRAVENOUS at 08:58

## 2023-01-16 RX ADMIN — METHYLPREDNISOLONE SODIUM SUCCINATE SCH MG: 40 INJECTION, POWDER, FOR SOLUTION INTRAMUSCULAR; INTRAVENOUS at 23:18

## 2023-01-16 RX ADMIN — FORMOTEROL FUMARATE DIHYDRATE SCH MCG: 20 SOLUTION RESPIRATORY (INHALATION) at 19:35

## 2023-01-16 RX ADMIN — IPRATROPIUM BROMIDE AND ALBUTEROL SULFATE SCH ML: .5; 3 SOLUTION RESPIRATORY (INHALATION) at 08:57

## 2023-01-16 RX ADMIN — METOPROLOL SUCCINATE SCH MG: 25 TABLET, EXTENDED RELEASE ORAL at 09:56

## 2023-01-16 RX ADMIN — DAPAGLIFLOZIN SCH MG: 10 TABLET, FILM COATED ORAL at 09:56

## 2023-01-16 RX ADMIN — INSULIN ASPART SCH UNIT: 100 INJECTION, SOLUTION INTRAVENOUS; SUBCUTANEOUS at 12:41

## 2023-01-16 RX ADMIN — INSULIN ASPART SCH UNIT: 100 INJECTION, SOLUTION INTRAVENOUS; SUBCUTANEOUS at 20:42

## 2023-01-16 RX ADMIN — ATORVASTATIN CALCIUM SCH MG: 40 TABLET, FILM COATED ORAL at 08:57

## 2023-01-16 RX ADMIN — FORMOTEROL FUMARATE DIHYDRATE SCH MCG: 20 SOLUTION RESPIRATORY (INHALATION) at 08:56

## 2023-01-16 RX ADMIN — METHYLPREDNISOLONE SODIUM SUCCINATE SCH MG: 40 INJECTION, POWDER, FOR SOLUTION INTRAMUSCULAR; INTRAVENOUS at 17:09

## 2023-01-16 RX ADMIN — APIXABAN SCH MG: 2.5 TABLET, FILM COATED ORAL at 20:41

## 2023-01-16 RX ADMIN — INSULIN ASPART SCH UNIT: 100 INJECTION, SOLUTION INTRAVENOUS; SUBCUTANEOUS at 17:09

## 2023-01-16 RX ADMIN — INSULIN ASPART SCH UNIT: 100 INJECTION, SOLUTION INTRAVENOUS; SUBCUTANEOUS at 06:56

## 2023-01-16 RX ADMIN — IPRATROPIUM BROMIDE AND ALBUTEROL SULFATE SCH: .5; 3 SOLUTION RESPIRATORY (INHALATION) at 03:33

## 2023-01-16 RX ADMIN — INSULIN DETEMIR SCH UNIT: 100 INJECTION, SOLUTION SUBCUTANEOUS at 20:41

## 2023-01-16 RX ADMIN — DOCUSATE SODIUM SCH MG: 100 CAPSULE, LIQUID FILLED ORAL at 20:41

## 2023-01-16 RX ADMIN — DILTIAZEM HYDROCHLORIDE SCH: 5 INJECTION INTRAVENOUS at 09:12

## 2023-01-16 RX ADMIN — BUDESONIDE SCH MG: 1 SUSPENSION RESPIRATORY (INHALATION) at 19:35

## 2023-01-16 RX ADMIN — IPRATROPIUM BROMIDE AND ALBUTEROL SULFATE SCH ML: .5; 3 SOLUTION RESPIRATORY (INHALATION) at 19:35

## 2023-01-16 RX ADMIN — DOCUSATE SODIUM SCH MG: 100 CAPSULE, LIQUID FILLED ORAL at 08:57

## 2023-01-16 RX ADMIN — INSULIN DETEMIR SCH UNIT: 100 INJECTION, SOLUTION SUBCUTANEOUS at 06:55

## 2023-01-16 RX ADMIN — METHYLPREDNISOLONE SODIUM SUCCINATE SCH MG: 40 INJECTION, POWDER, FOR SOLUTION INTRAMUSCULAR; INTRAVENOUS at 08:58

## 2023-01-16 RX ADMIN — ACETAMINOPHEN PRN MG: 325 TABLET, FILM COATED ORAL at 15:02

## 2023-01-16 RX ADMIN — IPRATROPIUM BROMIDE AND ALBUTEROL SULFATE SCH: .5; 3 SOLUTION RESPIRATORY (INHALATION) at 19:51

## 2023-01-16 RX ADMIN — IPRATROPIUM BROMIDE AND ALBUTEROL SULFATE SCH ML: .5; 3 SOLUTION RESPIRATORY (INHALATION) at 11:52

## 2023-01-16 RX ADMIN — IPRATROPIUM BROMIDE AND ALBUTEROL SULFATE SCH ML: .5; 3 SOLUTION RESPIRATORY (INHALATION) at 00:01

## 2023-01-16 RX ADMIN — BUDESONIDE SCH MG: 1 SUSPENSION RESPIRATORY (INHALATION) at 08:57

## 2023-01-16 NOTE — P.PN
Subjective


Progress Note Date: 01/16/23


This is a pleasant 82 years old female with past medical history of COPD, she 

has also on 6 L of oxygen.


Presents because of worsening dyspnea over one week associated with occasional 

cough and no phlegm.


Patient has central chest pain, nonradiating for the last week, rated as 5/10 in

severity, nonradiating, nonspecific


Patient denies any genitourinary symptoms, no vomiting diarrhea or abdominal 

pain.  No dysuria or urgency.  She has some mild dizziness but no headache, 

weakness or numbness.


She denies smoking alcohol or illicit drugs.  Patient has history of smoking


She cannot remember who is her pulmonologist and she states she doesn't have a 

heart doctor.


Patient is found with A. fib and RVR and emergency room and currently she is on 

Cardizem drip at 10 mg per hour.


Patient is afebrile, she was tachycardic on admission with heart rate up to 190,

currently is 140s.  Patient multiple moderately tachypneic but she's not in 

significant respiratory distress.  Blood pressure 108/84, she is saturating 100%

on BiPAP with pressures of 12/5.


Patient has mild leukocytosis of 13.4, hemoglobin 11.2.  Rest of CBC, INR is 

unremarkable.


Sodium 135, creatinine 1.1


Glucose on admission was 61, currently fifth 176 on the BMP.


Mild hypercalcemia mostly secondary to dehydration, rule out other causes


AST mildly elevated 65 and ALT 60 while removing normal 0.9.  Troponin is 

negative.  ProBNP elevated 6840.


Influenza and covid nondetected


Echocardiogram done on done on 12/16/2022, showing ejection fraction of 55% with

moderate to severe tricuspid regurgitation and moderate to severe pulmonary 

hypertension


Chest x-ray: Vascular and left mid lung andopacity, mostly atelectasis.  

Reviewed the chest x-ray by myself, it shows prominent vascular markings but 

less evident that last month.  No evidence of consolidation.


Patient was started on Cardizem drip, received a bolus of 500 mL, Solu-Medrol 

105 and started on 40 mg.  Lower and insulin.





1/14


Patient seen and examined.  Currently on BiPAP.  States she feels better.  

Denies any blood in the stools.  Case discussed with nursing staff





1/15.  Patient seen and examined.  Continues to be on BiPAP.  States she feels 

better.  Vital signs stable





1/16.  Patient seen and examined.  Currently on 15 L of oxygen.  Discussed with 

patient regarding her poor prognosis, patient is open to hospice.  Palliative 

care on board








REVIEW OF SYSTEMS: 


CONSTITUTIONAL: No fever, no malaise,. 


CARDIOVASCULAR: No chest pain, no palpitations, no syncope. 


PULMONARY: no cough, gets short of breath on exertion


GASTROINTESTINAL: No diarrhea, no nausea, no vomiting, no abdominal pain. 


NEUROLOGICAL: No headaches, no weakness, 





PHYSICAL EXAMINATION: 





GENERAL: The patient is alert and oriented x3, not in any acute distress. Well 

developed, well nourished. 


HEENT: Pupils are round and equally reacting to light. EOMI. No scleral icterus.

No conjunctival pallor. Normocephalic, atraumatic. No pharyngeal erythema. No 

thyromegaly. 


CARDIOVASCULAR: S1 and S2 present. No murmurs, rubs, or gallops. 


PULMONARY: Diminished breath sounds at bases bilaterally, tachypneic


ABDOMEN: Soft, nontender, nondistended, normoactive bowel sounds. No palpable 

organomegaly. 


MUSCULOSKELETAL: No joint swelling or deformity.


EXTREMITIES: No cyanosis, clubbing, or pedal edema. 


NEUROLOGICAL: Gross neurological examination did not reveal any focal deficits. 


SKIN: No rashes. 





Assessment and plan





acute CHF exacerbation.


Acute COPD exacerbation


Acute and chronic hypoxic respiratory failure


A. fib and RVR, present on admission, on Eliquis at home


moderate to severe tricuspid regurgitation and moderate to severe pulmonary 

hypertension


Chronic kidney disease stage III


Diabetes mellitus with Hypoglycemia and hyperglycemia more than 500 on admi

ssion,


Hyperlipidemia


Hypercalcemia


Mild transaminitis





Plan: 


Monitor vital signs


Monitor CBC, 


Continue oxygen supplementation


Continue BiPAP as needed


Continue IV Lasix 40 twice a day


Continue IV Solu-Medrol


Continue oral Cardizem 60 mg every 8 hourly.


Discontinue antibiotics


Follow-up on cardiology recommendations


Follow-up on pulmonary recommendations


Palliative care on board








Objective





- Vital Signs


Vital signs: 


                                   Vital Signs











Temp  97.4 F L  01/16/23 08:49


 


Pulse  77   01/16/23 12:36


 


Resp  19   01/16/23 12:36


 


BP  133/64   01/16/23 12:36


 


Pulse Ox  91 L  01/16/23 12:36


 


FiO2  50   01/16/23 04:00








                                 Intake & Output











 01/15/23 01/16/23 01/16/23





 18:59 06:59 18:59


 


Intake Total   240


 


Output Total 1200 600 


 


Balance -1200 -600 240


 


Weight   59.7 kg


 


Intake:   


 


  Oral   240


 


Output:   


 


  Urine 1200 600 


 


Other:   


 


  Voiding Method External Catheter External Catheter External Catheter














- Labs


CBC & Chem 7: 


                                 01/16/23 06:19





                                 01/16/23 06:19


Labs: 


                  Abnormal Lab Results - Last 24 Hours (Table)











  01/15/23 01/15/23 01/15/23 Range/Units





  16:42 18:25 19:35 


 


WBC     (3.8-10.6)  k/uL


 


RBC     (3.80-5.40)  m/uL


 


Hgb     (11.4-16.0)  gm/dL


 


Hct     (34.0-46.0)  %


 


Neutrophils #     (1.3-7.7)  k/uL


 


Lymphocytes #     (1.0-4.8)  k/uL


 


Sodium     (137-145)  mmol/L


 


Chloride     ()  mmol/L


 


Carbon Dioxide     (22-30)  mmol/L


 


BUN     (7-17)  mg/dL


 


Creatinine     (0.52-1.04)  mg/dL


 


Glucose     (74-99)  mg/dL


 


POC Glucose (mg/dL)  435 H  387 H  371 H  ()  mg/dL


 


AST     (14-36)  U/L


 


ALT     (4-34)  U/L


 


Total Protein     (6.3-8.2)  g/dL


 


Albumin     (3.5-5.0)  g/dL














  01/16/23 01/16/23 01/16/23 Range/Units





  06:14 06:19 06:19 


 


WBC   11.4 H   (3.8-10.6)  k/uL


 


RBC   2.70 L   (3.80-5.40)  m/uL


 


Hgb   8.4 L   (11.4-16.0)  gm/dL


 


Hct   26.8 L   (34.0-46.0)  %


 


Neutrophils #   10.6 H   (1.3-7.7)  k/uL


 


Lymphocytes #   0.4 L   (1.0-4.8)  k/uL


 


Sodium    135 L  (137-145)  mmol/L


 


Chloride    96 L  ()  mmol/L


 


Carbon Dioxide    34 H  (22-30)  mmol/L


 


BUN    31 H  (7-17)  mg/dL


 


Creatinine    1.15 H  (0.52-1.04)  mg/dL


 


Glucose    315 H  (74-99)  mg/dL


 


POC Glucose (mg/dL)  330 H    ()  mg/dL


 


AST    43 H  (14-36)  U/L


 


ALT    47 H  (4-34)  U/L


 


Total Protein    5.6 L  (6.3-8.2)  g/dL


 


Albumin    3.3 L  (3.5-5.0)  g/dL














  01/16/23 01/16/23 Range/Units





  11:42 11:42 


 


WBC    (3.8-10.6)  k/uL


 


RBC    (3.80-5.40)  m/uL


 


Hgb    (11.4-16.0)  gm/dL


 


Hct    (34.0-46.0)  %


 


Neutrophils #    (1.3-7.7)  k/uL


 


Lymphocytes #    (1.0-4.8)  k/uL


 


Sodium    (137-145)  mmol/L


 


Chloride    ()  mmol/L


 


Carbon Dioxide    (22-30)  mmol/L


 


BUN    (7-17)  mg/dL


 


Creatinine    (0.52-1.04)  mg/dL


 


Glucose    (74-99)  mg/dL


 


POC Glucose (mg/dL)  596 H  412 H  ()  mg/dL


 


AST    (14-36)  U/L


 


ALT    (4-34)  U/L


 


Total Protein    (6.3-8.2)  g/dL


 


Albumin    (3.5-5.0)  g/dL

## 2023-01-16 NOTE — P.PN
Subjective





Patient is seen for follow-up for metabolic acidosis and mild acute kidney 

injury.


Diuretics had been on hold but restarted today.


Serum creatinine at 1.15 mg/dL today.





Patient has external catheter with good urine output noted.





Objective





- Vital Signs


Vital signs: 


                                   Vital Signs











Temp  97.4 F L  01/16/23 08:49


 


Pulse  82   01/16/23 10:44


 


Resp  20   01/16/23 10:44


 


BP  125/69   01/16/23 08:49


 


Pulse Ox  91 L  01/16/23 08:49


 


FiO2  50   01/16/23 04:00








                                 Intake & Output











 01/15/23 01/16/23 01/16/23





 18:59 06:59 18:59


 


Intake Total   240


 


Output Total 1200 600 


 


Balance -1200 -600 240


 


Weight   59.7 kg


 


Intake:   


 


  Oral   240


 


Output:   


 


  Urine 1200 600 


 


Other:   


 


  Voiding Method External Catheter External Catheter External Catheter














- Exam





Awake, comfortable, no acute distress


Examination of the heart S1 and S2


Examination lungs decreased breath sounds at the bases


Abdomen is soft nontender


 examination lower extremity shows  edema bilaterally. 





- Labs


CBC & Chem 7: 


                                 01/16/23 06:19





                                 01/16/23 06:19


Labs: 


                  Abnormal Lab Results - Last 24 Hours (Table)











  01/15/23 01/15/23 01/15/23 Range/Units





  11:47 16:42 18:25 


 


WBC     (3.8-10.6)  k/uL


 


RBC     (3.80-5.40)  m/uL


 


Hgb     (11.4-16.0)  gm/dL


 


Hct     (34.0-46.0)  %


 


Neutrophils #     (1.3-7.7)  k/uL


 


Lymphocytes #     (1.0-4.8)  k/uL


 


Sodium     (137-145)  mmol/L


 


Chloride     ()  mmol/L


 


Carbon Dioxide     (22-30)  mmol/L


 


BUN     (7-17)  mg/dL


 


Creatinine     (0.52-1.04)  mg/dL


 


Glucose     (74-99)  mg/dL


 


POC Glucose (mg/dL)  381 H  435 H  387 H  ()  mg/dL


 


AST     (14-36)  U/L


 


ALT     (4-34)  U/L


 


Total Protein     (6.3-8.2)  g/dL


 


Albumin     (3.5-5.0)  g/dL














  01/15/23 01/16/23 01/16/23 Range/Units





  19:35 06:14 06:19 


 


WBC    11.4 H  (3.8-10.6)  k/uL


 


RBC    2.70 L  (3.80-5.40)  m/uL


 


Hgb    8.4 L  (11.4-16.0)  gm/dL


 


Hct    26.8 L  (34.0-46.0)  %


 


Neutrophils #    10.6 H  (1.3-7.7)  k/uL


 


Lymphocytes #    0.4 L  (1.0-4.8)  k/uL


 


Sodium     (137-145)  mmol/L


 


Chloride     ()  mmol/L


 


Carbon Dioxide     (22-30)  mmol/L


 


BUN     (7-17)  mg/dL


 


Creatinine     (0.52-1.04)  mg/dL


 


Glucose     (74-99)  mg/dL


 


POC Glucose (mg/dL)  371 H  330 H   ()  mg/dL


 


AST     (14-36)  U/L


 


ALT     (4-34)  U/L


 


Total Protein     (6.3-8.2)  g/dL


 


Albumin     (3.5-5.0)  g/dL














  01/16/23 Range/Units





  06:19 


 


WBC   (3.8-10.6)  k/uL


 


RBC   (3.80-5.40)  m/uL


 


Hgb   (11.4-16.0)  gm/dL


 


Hct   (34.0-46.0)  %


 


Neutrophils #   (1.3-7.7)  k/uL


 


Lymphocytes #   (1.0-4.8)  k/uL


 


Sodium  135 L  (137-145)  mmol/L


 


Chloride  96 L  ()  mmol/L


 


Carbon Dioxide  34 H  (22-30)  mmol/L


 


BUN  31 H  (7-17)  mg/dL


 


Creatinine  1.15 H  (0.52-1.04)  mg/dL


 


Glucose  315 H  (74-99)  mg/dL


 


POC Glucose (mg/dL)   ()  mg/dL


 


AST  43 H  (14-36)  U/L


 


ALT  47 H  (4-34)  U/L


 


Total Protein  5.6 L  (6.3-8.2)  g/dL


 


Albumin  3.3 L  (3.5-5.0)  g/dL














Assessment and Plan


Assessment: 





1.  Severe lactic acidosis most likely associated with metformin currently 

improved


2.  Chronic kidney disease NKF stage 2-3 secondary to nephrosclerosis.  UA shows

no evidence of proteinuria.


3.  A. fib with controlled ventricular response


4.  CHF exacerbation, diastolic maintained on IV Lasix


5.  Mild hyponatremia improved with correction of blood sugar


Plan: 





Continue to diurese patient


Repeat labs in a.m.

## 2023-01-16 NOTE — P.PN
Subjective


Progress Note Date: 01/16/23


Principal diagnosis: 





Respiratory failure.





Acute on chronic hypoxic respiratory failure secondary to acute COPD 

exacerbation and suspect acute exacerbation of diastolic congestive heart 

failure





This is a very pleasant 82-year-old female patient with a history of atrial 

fibrillation anticoagulated with Eliquis, diabetes mellitus, chronic obstructive

pulmonary disease requiring home oxygen and she normally wears 6 L at home.  She

was brought in for a 1 week history of worsening shortness of breath, chest 

tightness and palpitations and was found to be hypoxemic at 75% on 6 L nasal 

cannula.  She was also found to be in atrial fibrillation with a rapid 

ventricular response.  She apparently had not been taking her medications at 

home.  White count 13.4.  Hemoglobin 11.2.  .8.  Platelets 400.  Sodium 

135.  Potassium 4.9.  Bicarb 17.  BUN 15.  Creatinine 1.17.  Blood sugar 576.  

Lactic acid 7.1.  Anion gap 21.  AST 65.  ALT 60.  Troponin 0.044.  ProBNP 6830.

 Pro-calcitonin 0.09.  Urine with 4+ glucose trace ketones.  Coronavirus not 

detected.  Influenza screen negative.  Chest x-ray shows bibasilar left midlung 

linear airspace opacities.  No focal consolidation.  No pneumothorax.  She is 

seen today in consultation on the selective care unit.  She is currently sitting

up in bed.  Awake and alert.  She does appear short of breath.  She is using 

some accessory muscles.  Arterial blood gases on 100% FiO2 revealed a PaO2 of 

269, pCO2 32, pH 7.44.  She was initially on BiPAP 12/5 and down to 50% FiO2.  

She's been initiated on DuoNeb inhalations, Symbicort, IV Solu-Medrol.  Be 

started on Cardizem drip at 10 mg per hour.  She is on antibiotics in the form 

of ceftriaxone.  Anticoagulated with Eliquis.





The patient is seen today 01/14/2023 in follow-up in the selective care unit.  

She was again having increasing oxygen demands and had been titrated up to 15 L 

but still continues saturations in the 70s.  She was subsequently placed on 

BiPAP 12/5 and 60% FiO2 with O2 sat durations improved.  Currently at 97%.  Her 

FiO2 is dialed down to 40%.  She remains awake and alert.  Chest x-ray continues

to show airspace opacities projecting over the heart.  No evidence of 

pneumothorax, focal consolidation or pleural effusion.  Cardiomegaly.  Urine 

culture pending.  White count 11.1.  Hemoglobin 8.5.  .1.  Sodium 131.  

Potassium 4.4.  Bicarb 25.  BUN 19.  Creatinine 1.10.  Glucose 309.  Currently 

in a -560 ML balance.  She remains on Lasix 40 mg IV every 12 hours.  DuoNeb 

inhalations, Pulmicort and Perforomist inhalations, IV Solu-Medrol.  Antibiotics

in the form of ceftriaxone.  Anticoagulated with Eliquis.





Reevaluated today on 1/15/2023, patient remains borderline, continues to have 

BiPAP most of the time, today I am recommending a high flow nasal cannula to be 

used instead of BiPAP.  She is on 12/5/50%, and she is maintaining her saturatio

ns well, however off BiPAP she risks down to the 70s.  Remains on 

bronchodilators, remains on steroids, remains on antibiotics, and she is also on

insulin, still receiving Lasix 40 mg every 12 hours a creatinine is rising hence

I will cut down the Lasix to 40 mg daily instead of twice a day.  Remains on 

Solu-Medrol 60 mg IV push every 6 hours, however her blood sugars are 

consistently high, and I'm cutting down the prednisone to 40 mg IV push every 8 

hours





Progress note dated 01/16/2023.





Patient is seen in room 373.  82-year-old female with a history of respiratory 

failure.  Currently on 15 L high flow oxygen.  She's not receiving any IV 

fluids.  She does use BiPAP, with settings of 12/5 and 50%.  Currently resting 

comfortably.  Denies any complaints or issues.  Feeling much better.  The 

patient is a DO NOT RESUSCITATE patient.  White count 11.4, hemoglobin 8.4, 

hematocrit 26.8, and platelet count 238,000.  Sodium 135, potassium 4, chlorides

96, CO2 34, BUN 31, and creatinine 1.15.





Objective





- Vital Signs


Vital signs: 


                                   Vital Signs











Temp  97.4 F L  01/16/23 08:49


 


Pulse  80   01/16/23 09:18


 


Resp  20   01/16/23 08:49


 


BP  125/69   01/16/23 08:49


 


Pulse Ox  91 L  01/16/23 08:49


 


FiO2  50   01/16/23 04:00








                                 Intake & Output











 01/15/23 01/16/23 01/16/23





 18:59 06:59 18:59


 


Intake Total   240


 


Output Total 1200 600 


 


Balance -1200 -600 240


 


Weight   59.7 kg


 


Intake:   


 


  Oral   240


 


Output:   


 


  Urine 1200 600 


 


Other:   


 


  Voiding Method External Catheter External Catheter 














- Exam





No acute distress, oriented 3.  No obvious respiratory distress even though the

patient's on 15 L high flow oxygen.





HEENT examination is grossly unremarkable.  





Neck supple.  Full range of motion.  No adenopathy thyromegaly or neck vein 

distention.





Cardiovascular examination reveals regular rhythm rate.  S1-S2 normal.  No S3 or

S4.  No discernible murmur noted.  Heart sounds are distant.  Heart rate 80 bpm.





Lungs reveal scattered bilateral rhonchi.  No wheezes.  No crackles.  Breath 

sounds equal bilaterally.  Saturations are 91% on 15 L high flow oxygen.





Abdomen soft bowel sounds are heard.  No masses or tenderness.





Extremities are intact.  No cyanosis clubbing or edema.





Skin is without rash or lesion.





Neurologic examination is brief but nonfocal.





- Labs


CBC & Chem 7: 


                                 01/16/23 06:19





                                 01/16/23 06:19


Labs: 


                  Abnormal Lab Results - Last 24 Hours (Table)











  01/15/23 01/15/23 01/15/23 Range/Units





  10:15 11:47 16:42 


 


WBC     (3.8-10.6)  k/uL


 


RBC     (3.80-5.40)  m/uL


 


Hgb     (11.4-16.0)  gm/dL


 


Hct     (34.0-46.0)  %


 


Neutrophils #     (1.3-7.7)  k/uL


 


Lymphocytes #     (1.0-4.8)  k/uL


 


Sodium     (137-145)  mmol/L


 


Chloride  96 L    ()  mmol/L


 


Carbon Dioxide  31 H    (22-30)  mmol/L


 


BUN  28 H    (7-17)  mg/dL


 


Creatinine  1.23 H    (0.52-1.04)  mg/dL


 


Glucose  345 H    (74-99)  mg/dL


 


POC Glucose (mg/dL)   381 H  435 H  ()  mg/dL


 


AST     (14-36)  U/L


 


ALT  35 H    (4-34)  U/L


 


Total Protein     (6.3-8.2)  g/dL


 


Albumin     (3.5-5.0)  g/dL














  01/15/23 01/15/23 01/16/23 Range/Units





  18:25 19:35 06:14 


 


WBC     (3.8-10.6)  k/uL


 


RBC     (3.80-5.40)  m/uL


 


Hgb     (11.4-16.0)  gm/dL


 


Hct     (34.0-46.0)  %


 


Neutrophils #     (1.3-7.7)  k/uL


 


Lymphocytes #     (1.0-4.8)  k/uL


 


Sodium     (137-145)  mmol/L


 


Chloride     ()  mmol/L


 


Carbon Dioxide     (22-30)  mmol/L


 


BUN     (7-17)  mg/dL


 


Creatinine     (0.52-1.04)  mg/dL


 


Glucose     (74-99)  mg/dL


 


POC Glucose (mg/dL)  387 H  371 H  330 H  ()  mg/dL


 


AST     (14-36)  U/L


 


ALT     (4-34)  U/L


 


Total Protein     (6.3-8.2)  g/dL


 


Albumin     (3.5-5.0)  g/dL














  01/16/23 01/16/23 Range/Units





  06:19 06:19 


 


WBC  11.4 H   (3.8-10.6)  k/uL


 


RBC  2.70 L   (3.80-5.40)  m/uL


 


Hgb  8.4 L   (11.4-16.0)  gm/dL


 


Hct  26.8 L   (34.0-46.0)  %


 


Neutrophils #  10.6 H   (1.3-7.7)  k/uL


 


Lymphocytes #  0.4 L   (1.0-4.8)  k/uL


 


Sodium   135 L  (137-145)  mmol/L


 


Chloride   96 L  ()  mmol/L


 


Carbon Dioxide   34 H  (22-30)  mmol/L


 


BUN   31 H  (7-17)  mg/dL


 


Creatinine   1.15 H  (0.52-1.04)  mg/dL


 


Glucose   315 H  (74-99)  mg/dL


 


POC Glucose (mg/dL)    ()  mg/dL


 


AST   43 H  (14-36)  U/L


 


ALT   47 H  (4-34)  U/L


 


Total Protein   5.6 L  (6.3-8.2)  g/dL


 


Albumin   3.3 L  (3.5-5.0)  g/dL














Assessment and Plan


Assessment: 





Acute on chronic hypoxemic respiratory failure secondary to diastolic CHF, and 

COPD exacerbation.





History of atrial fibrillation with rapid ventricular response.





Troponin leak.





Hyperglycemia.





Type 2 diabetes mellitus.





Mild elevation of liver function test.





Severe COPD, oxygen dependent.





History of heavy tobacco dependence.





Hyperlipidemia.





Medication noncompliance.


Plan: 





Plan dated 01/16/2023.





He is on 15 L high flow oxygen.  She does have BiPAP at the room, with settings 

of 12/5 and 50%.  Labs, x-rays, medications are all reviewed.  Prognosis is 

guarded.  We will continue to follow the patient and make recommendations along 

the way.  Continue with current medications.  Prognosis is certainly guarded.  

Antibiotics can probably be discontinued.  Pro-calcitonin level was only 0.09.


Time with Patient: Less than 30

## 2023-01-16 NOTE — P.PN
Subjective


Progress Note Date: 01/16/23








History of present illness:


Patient is an 82-year-old female with a known history of atrial fibrillation,

severe chronic obstructive lung disease, on home O2 who stopped smoking about a 

year ago.We will consult to see the patient for atrial fibrillation.  Patient 

initially presented to the ER.  EKG showed atrial fibrillation with rapid 

ventricular rate of 178.  Patient was started on Cardizem at 5 mg an hour and 

put on BiPAP.  While in the ER patient converted to sinus rhythm.  She is on 

eliquis for anticoagulation.  Heart rate is controlled in the 70s to 80s.  Chest

x-ray showed mild lung opacities likely atelectasis.  Patient had an 

echocardiogram on 12/16/2022 which showed normal sinus rhythm with severe 

tricuspid regurgitation, severe pulmonary hypertension, right atrium 

enlargement, and right ventricular dilation.  She is also currently being 

treated with IV antibiotics for UTI.  Will obtain 2-D echocardiogram.  Continue 

with eliquis





1/16


Patient had another episode of A. fib with RVR started on a Cardizem drip last 

night and subsequently converted to sinus rhythm.  Patient is off Cardizem at 

this time.  Heart rate is in the 70s and /64. PO 91% on 15L HF. She is 

continued on eliquis.  Echocardiogram reveals normal LV systolic function.  

Severe pulmonary hypertension.  Moderate to severe tricuspid regurgitation.  

Mild mitral regurgitation.  Potassium 4.0, BUN 31 creatinine 1.15.








Physical examination:


Gen: This is an 82-year-old  female.  She is resting in bed appears to 

be comfortable


VS: reviewed


HEENT: Head is atraumatic, normocephalic. Pupils equal, round. Sclerae is 

anicteric. 


NECK: Supple. No JVD. No lymphadenopathy. No thyromegaly. 


LUNGS: Scattered rhonchi.  No intercostal retractions.


HEART: Regular rate and rhythm.  Systolic murmur. 


ABDOMEN: Soft. Bowel sounds are present. No masses.  No tenderness.


EXTREMITIES: No pedal edema.  No calf tenderness.


NEUROLOGICAL: Patient is awake, alert and oriented x3. Cranial nerves 2 through 

12 are grossly intact. 





 





Assessment:


Persistent atrial fibrillation with RVR, currently in sinus rhythm


Acute diastolic heart failure


Lactic acidosis


Chronic kidney disease











Plan:


Continue patient on eliquis 2.5 mg twice daily and Toprol-XL 25 mg daily


Continue Lasix 40 mg IV daily


Obtain 2-D echocardiogram and Doppler study to assess cardiac structure and 

function


Further recommendations to follow based upon clinical course


Thank you kindly for this consultation.





Nurse practitioner note has been reviewed, I agree with documented findings and 

plan of care.  Patient was seen and examined.








Objective





- Vital Signs


Vital signs: 


                                   Vital Signs











Temp  97.4 F L  01/16/23 08:49


 


Pulse  77   01/16/23 12:36


 


Resp  19   01/16/23 12:36


 


BP  133/64   01/16/23 12:36


 


Pulse Ox  91 L  01/16/23 12:36


 


FiO2  50   01/16/23 04:00








                                 Intake & Output











 01/15/23 01/16/23 01/16/23





 18:59 06:59 18:59


 


Intake Total   240


 


Output Total 1200 600 


 


Balance -1200 -600 240


 


Weight   59.7 kg


 


Intake:   


 


  Oral   240


 


Output:   


 


  Urine 1200 600 


 


Other:   


 


  Voiding Method External Catheter External Catheter External Catheter














- Labs


CBC & Chem 7: 


                                 01/16/23 06:19





                                 01/16/23 06:19


Labs: 


                  Abnormal Lab Results - Last 24 Hours (Table)











  01/15/23 01/15/23 01/15/23 Range/Units





  16:42 18:25 19:35 


 


WBC     (3.8-10.6)  k/uL


 


RBC     (3.80-5.40)  m/uL


 


Hgb     (11.4-16.0)  gm/dL


 


Hct     (34.0-46.0)  %


 


Neutrophils #     (1.3-7.7)  k/uL


 


Lymphocytes #     (1.0-4.8)  k/uL


 


Sodium     (137-145)  mmol/L


 


Chloride     ()  mmol/L


 


Carbon Dioxide     (22-30)  mmol/L


 


BUN     (7-17)  mg/dL


 


Creatinine     (0.52-1.04)  mg/dL


 


Glucose     (74-99)  mg/dL


 


POC Glucose (mg/dL)  435 H  387 H  371 H  ()  mg/dL


 


AST     (14-36)  U/L


 


ALT     (4-34)  U/L


 


Total Protein     (6.3-8.2)  g/dL


 


Albumin     (3.5-5.0)  g/dL














  01/16/23 01/16/23 01/16/23 Range/Units





  06:14 06:19 06:19 


 


WBC   11.4 H   (3.8-10.6)  k/uL


 


RBC   2.70 L   (3.80-5.40)  m/uL


 


Hgb   8.4 L   (11.4-16.0)  gm/dL


 


Hct   26.8 L   (34.0-46.0)  %


 


Neutrophils #   10.6 H   (1.3-7.7)  k/uL


 


Lymphocytes #   0.4 L   (1.0-4.8)  k/uL


 


Sodium    135 L  (137-145)  mmol/L


 


Chloride    96 L  ()  mmol/L


 


Carbon Dioxide    34 H  (22-30)  mmol/L


 


BUN    31 H  (7-17)  mg/dL


 


Creatinine    1.15 H  (0.52-1.04)  mg/dL


 


Glucose    315 H  (74-99)  mg/dL


 


POC Glucose (mg/dL)  330 H    ()  mg/dL


 


AST    43 H  (14-36)  U/L


 


ALT    47 H  (4-34)  U/L


 


Total Protein    5.6 L  (6.3-8.2)  g/dL


 


Albumin    3.3 L  (3.5-5.0)  g/dL














  01/16/23 01/16/23 Range/Units





  11:42 11:42 


 


WBC    (3.8-10.6)  k/uL


 


RBC    (3.80-5.40)  m/uL


 


Hgb    (11.4-16.0)  gm/dL


 


Hct    (34.0-46.0)  %


 


Neutrophils #    (1.3-7.7)  k/uL


 


Lymphocytes #    (1.0-4.8)  k/uL


 


Sodium    (137-145)  mmol/L


 


Chloride    ()  mmol/L


 


Carbon Dioxide    (22-30)  mmol/L


 


BUN    (7-17)  mg/dL


 


Creatinine    (0.52-1.04)  mg/dL


 


Glucose    (74-99)  mg/dL


 


POC Glucose (mg/dL)  596 H  412 H  ()  mg/dL


 


AST    (14-36)  U/L


 


ALT    (4-34)  U/L


 


Total Protein    (6.3-8.2)  g/dL


 


Albumin    (3.5-5.0)  g/dL

## 2023-01-17 LAB
BASOPHILS # BLD AUTO: 0 K/UL (ref 0–0.2)
BASOPHILS NFR BLD AUTO: 0 %
EOSINOPHIL # BLD AUTO: 0 K/UL (ref 0–0.7)
EOSINOPHIL NFR BLD AUTO: 0 %
ERYTHROCYTE [DISTWIDTH] IN BLOOD BY AUTOMATED COUNT: 2.92 M/UL (ref 3.8–5.4)
ERYTHROCYTE [DISTWIDTH] IN BLOOD: 14.7 % (ref 11.5–15.5)
GLUCOSE BLD-MCNC: 109 MG/DL (ref 70–110)
GLUCOSE BLD-MCNC: 224 MG/DL (ref 70–110)
GLUCOSE BLD-MCNC: 263 MG/DL (ref 70–110)
GLUCOSE BLD-MCNC: 405 MG/DL (ref 70–110)
HCT VFR BLD AUTO: 29 % (ref 34–46)
HGB BLD-MCNC: 9.1 GM/DL (ref 11.4–16)
LYMPHOCYTES # SPEC AUTO: 0.6 K/UL (ref 1–4.8)
LYMPHOCYTES NFR SPEC AUTO: 5 %
MCH RBC QN AUTO: 31.1 PG (ref 25–35)
MCHC RBC AUTO-ENTMCNC: 31.3 G/DL (ref 31–37)
MCV RBC AUTO: 99.2 FL (ref 80–100)
MONOCYTES # BLD AUTO: 0.5 K/UL (ref 0–1)
MONOCYTES NFR BLD AUTO: 4 %
NEUTROPHILS # BLD AUTO: 10.8 K/UL (ref 1.3–7.7)
NEUTROPHILS NFR BLD AUTO: 90 %
PLATELET # BLD AUTO: 259 K/UL (ref 150–450)
WBC # BLD AUTO: 12 K/UL (ref 3.8–10.6)

## 2023-01-17 RX ADMIN — ACETAMINOPHEN PRN MG: 325 TABLET, FILM COATED ORAL at 23:57

## 2023-01-17 RX ADMIN — FORMOTEROL FUMARATE DIHYDRATE SCH MCG: 20 SOLUTION RESPIRATORY (INHALATION) at 20:22

## 2023-01-17 RX ADMIN — INSULIN ASPART SCH UNIT: 100 INJECTION, SOLUTION INTRAVENOUS; SUBCUTANEOUS at 21:10

## 2023-01-17 RX ADMIN — INSULIN ASPART SCH: 100 INJECTION, SOLUTION INTRAVENOUS; SUBCUTANEOUS at 06:39

## 2023-01-17 RX ADMIN — METOPROLOL SUCCINATE SCH MG: 25 TABLET, EXTENDED RELEASE ORAL at 08:27

## 2023-01-17 RX ADMIN — INSULIN ASPART SCH UNIT: 100 INJECTION, SOLUTION INTRAVENOUS; SUBCUTANEOUS at 17:39

## 2023-01-17 RX ADMIN — IPRATROPIUM BROMIDE AND ALBUTEROL SULFATE SCH ML: .5; 3 SOLUTION RESPIRATORY (INHALATION) at 20:10

## 2023-01-17 RX ADMIN — IPRATROPIUM BROMIDE AND ALBUTEROL SULFATE SCH ML: .5; 3 SOLUTION RESPIRATORY (INHALATION) at 08:34

## 2023-01-17 RX ADMIN — FORMOTEROL FUMARATE DIHYDRATE SCH MCG: 20 SOLUTION RESPIRATORY (INHALATION) at 08:34

## 2023-01-17 RX ADMIN — DOCUSATE SODIUM SCH MG: 100 CAPSULE, LIQUID FILLED ORAL at 08:27

## 2023-01-17 RX ADMIN — INSULIN DETEMIR SCH UNIT: 100 INJECTION, SOLUTION SUBCUTANEOUS at 06:44

## 2023-01-17 RX ADMIN — METHYLPREDNISOLONE SODIUM SUCCINATE SCH MG: 40 INJECTION, POWDER, FOR SOLUTION INTRAMUSCULAR; INTRAVENOUS at 08:27

## 2023-01-17 RX ADMIN — BUDESONIDE SCH MG: 1 SUSPENSION RESPIRATORY (INHALATION) at 20:11

## 2023-01-17 RX ADMIN — ACETAMINOPHEN PRN MG: 325 TABLET, FILM COATED ORAL at 10:05

## 2023-01-17 RX ADMIN — PANTOPRAZOLE SODIUM SCH MG: 40 TABLET, DELAYED RELEASE ORAL at 06:44

## 2023-01-17 RX ADMIN — IPRATROPIUM BROMIDE AND ALBUTEROL SULFATE SCH ML: .5; 3 SOLUTION RESPIRATORY (INHALATION) at 15:24

## 2023-01-17 RX ADMIN — INSULIN ASPART SCH UNIT: 100 INJECTION, SOLUTION INTRAVENOUS; SUBCUTANEOUS at 06:44

## 2023-01-17 RX ADMIN — APIXABAN SCH MG: 2.5 TABLET, FILM COATED ORAL at 08:27

## 2023-01-17 RX ADMIN — BUDESONIDE SCH MG: 1 SUSPENSION RESPIRATORY (INHALATION) at 08:34

## 2023-01-17 RX ADMIN — FUROSEMIDE SCH MG: 10 INJECTION, SOLUTION INTRAMUSCULAR; INTRAVENOUS at 08:27

## 2023-01-17 RX ADMIN — METHYLPREDNISOLONE SODIUM SUCCINATE SCH MG: 40 INJECTION, POWDER, FOR SOLUTION INTRAMUSCULAR; INTRAVENOUS at 17:39

## 2023-01-17 RX ADMIN — DAPAGLIFLOZIN SCH MG: 10 TABLET, FILM COATED ORAL at 08:27

## 2023-01-17 RX ADMIN — APIXABAN SCH MG: 2.5 TABLET, FILM COATED ORAL at 21:09

## 2023-01-17 RX ADMIN — INSULIN ASPART SCH UNIT: 100 INJECTION, SOLUTION INTRAVENOUS; SUBCUTANEOUS at 12:32

## 2023-01-17 RX ADMIN — IPRATROPIUM BROMIDE AND ALBUTEROL SULFATE SCH ML: .5; 3 SOLUTION RESPIRATORY (INHALATION) at 11:42

## 2023-01-17 RX ADMIN — SODIUM FERRIC GLUCONATE COMPLEX SCH MLS/HR: 12.5 INJECTION INTRAVENOUS at 09:28

## 2023-01-17 RX ADMIN — INSULIN DETEMIR SCH UNIT: 100 INJECTION, SOLUTION SUBCUTANEOUS at 21:09

## 2023-01-17 RX ADMIN — ATORVASTATIN CALCIUM SCH MG: 40 TABLET, FILM COATED ORAL at 08:28

## 2023-01-17 RX ADMIN — METHYLPREDNISOLONE SODIUM SUCCINATE SCH MG: 40 INJECTION, POWDER, FOR SOLUTION INTRAMUSCULAR; INTRAVENOUS at 23:56

## 2023-01-17 RX ADMIN — DOCUSATE SODIUM SCH MG: 100 CAPSULE, LIQUID FILLED ORAL at 21:08

## 2023-01-17 NOTE — P.PN
Subjective


Progress Note Date: 01/17/23


Principal diagnosis: 





Respiratory failure.





Acute on chronic hypoxic respiratory failure secondary to acute COPD 

exacerbation and suspect acute exacerbation of diastolic congestive heart 

failure





This is a very pleasant 82-year-old female patient with a history of atrial 

fibrillation anticoagulated with Eliquis, diabetes mellitus, chronic obstructive

pulmonary disease requiring home oxygen and she normally wears 6 L at home.  She

was brought in for a 1 week history of worsening shortness of breath, chest 

tightness and palpitations and was found to be hypoxemic at 75% on 6 L nasal 

cannula.  She was also found to be in atrial fibrillation with a rapid 

ventricular response.  She apparently had not been taking her medications at 

home.  White count 13.4.  Hemoglobin 11.2.  .8.  Platelets 400.  Sodium 

135.  Potassium 4.9.  Bicarb 17.  BUN 15.  Creatinine 1.17.  Blood sugar 576.  

Lactic acid 7.1.  Anion gap 21.  AST 65.  ALT 60.  Troponin 0.044.  ProBNP 6830.

 Pro-calcitonin 0.09.  Urine with 4+ glucose trace ketones.  Coronavirus not 

detected.  Influenza screen negative.  Chest x-ray shows bibasilar left midlung 

linear airspace opacities.  No focal consolidation.  No pneumothorax.  She is 

seen today in consultation on the selective care unit.  She is currently sitting

up in bed.  Awake and alert.  She does appear short of breath.  She is using 

some accessory muscles.  Arterial blood gases on 100% FiO2 revealed a PaO2 of 

269, pCO2 32, pH 7.44.  She was initially on BiPAP 12/5 and down to 50% FiO2.  

She's been initiated on DuoNeb inhalations, Symbicort, IV Solu-Medrol.  Be 

started on Cardizem drip at 10 mg per hour.  She is on antibiotics in the form 

of ceftriaxone.  Anticoagulated with Eliquis.





The patient is seen today 01/14/2023 in follow-up in the selective care unit.  

She was again having increasing oxygen demands and had been titrated up to 15 L 

but still continues saturations in the 70s.  She was subsequently placed on 

BiPAP 12/5 and 60% FiO2 with O2 sat durations improved.  Currently at 97%.  Her 

FiO2 is dialed down to 40%.  She remains awake and alert.  Chest x-ray continues

to show airspace opacities projecting over the heart.  No evidence of 

pneumothorax, focal consolidation or pleural effusion.  Cardiomegaly.  Urine 

culture pending.  White count 11.1.  Hemoglobin 8.5.  .1.  Sodium 131.  

Potassium 4.4.  Bicarb 25.  BUN 19.  Creatinine 1.10.  Glucose 309.  Currently 

in a -560 ML balance.  She remains on Lasix 40 mg IV every 12 hours.  DuoNeb 

inhalations, Pulmicort and Perforomist inhalations, IV Solu-Medrol.  Antibiotics

in the form of ceftriaxone.  Anticoagulated with Eliquis.





Reevaluated today on 1/15/2023, patient remains borderline, continues to have 

BiPAP most of the time, today I am recommending a high flow nasal cannula to be 

used instead of BiPAP.  She is on 12/5/50%, and she is maintaining her saturatio

ns well, however off BiPAP she risks down to the 70s.  Remains on 

bronchodilators, remains on steroids, remains on antibiotics, and she is also on

insulin, still receiving Lasix 40 mg every 12 hours a creatinine is rising hence

I will cut down the Lasix to 40 mg daily instead of twice a day.  Remains on 

Solu-Medrol 60 mg IV push every 6 hours, however her blood sugars are 

consistently high, and I'm cutting down the prednisone to 40 mg IV push every 8 

hours





Progress note dated 01/16/2023.





Patient is seen in room 373.  82-year-old female with a history of respiratory 

failure.  Currently on 15 L high flow oxygen.  She's not receiving any IV 

fluids.  She does use BiPAP, with settings of 12/5 and 50%.  Currently resting 

comfortably.  Denies any complaints or issues.  Feeling much better.  The 

patient is a DO NOT RESUSCITATE patient.  White count 11.4, hemoglobin 8.4, 

hematocrit 26.8, and platelet count 238,000.  Sodium 135, potassium 4, chlorides

96, CO2 34, BUN 31, and creatinine 1.15.





Progress note dated 01/17/2023.





Patient is again seen in room 373.  82-year-old female with history of 

respiratory failure.  She's currently on 15 L high flow oxygen.  The patient 

also uses BiPAP intermittently, with settings of 12/5 and 50%.  The patient is a

DO NOT RESUSCITATE patient.  Today she is asking for something for pain, i.e. 

Tylenol.  White count 12, hemoglobin 9.1, hematocrit 29, and platelet count was 

normal.  Glucose was 263.





Objective





- Vital Signs


Vital signs: 


                                   Vital Signs











Temp  97.7 F   01/17/23 08:25


 


Pulse  86   01/17/23 11:42


 


Resp  18   01/17/23 10:24


 


BP  133/78   01/17/23 08:25


 


Pulse Ox  93 L  01/17/23 08:25


 


FiO2  50   01/16/23 04:00








                                 Intake & Output











 01/16/23 01/17/23 01/17/23





 18:59 06:59 18:59


 


Intake Total 1300  


 


Output Total 


 


Balance 400 -400 -1000


 


Weight 59.7 kg 58 kg 


 


Intake:   


 


  Intake, IV Titration 100  





  Amount   


 


    Sodium Ferric Gluconat- 100  





    Sucrose 125 mg In Sodium   





    Chloride 0.9% 100 ml @   





    100 mls/hr IVPB DAILY [facility]   





    Rx#:[id number]   


 


  Oral 1200  


 


Output:   


 


  Urine 


 


Other:   


 


  Voiding Method External Catheter External Catheter External Catheter


 


  # Bowel Movements  1 














- Exam





No acute distress, oriented 3.  No obvious respiratory distress even though the

patient's on 13 L high flow oxygen.  Saturations are 93%.





HEENT examination is grossly unremarkable.  





Neck supple.  Full range of motion.  No adenopathy thyromegaly or neck vein 

distention.





Cardiovascular examination reveals regular rhythm rate.  S1-S2 normal.  No S3 or

S4.  No discernible murmur noted.  Heart sounds are distant.  Heart rate 86 bpm.





Lungs reveal scattered bilateral rhonchi.  No wheezes.  No crackles.  Breath 

sounds equal bilaterally.  Saturations are 93% on 13 L high flow oxygen.





Abdomen soft bowel sounds are heard.  No masses or tenderness.





Extremities are intact.  No cyanosis clubbing or edema.





Skin is without rash or lesion.





Neurologic examination is brief but nonfocal.





- Labs


CBC & Chem 7: 


                                 01/17/23 08:02





                                 01/16/23 06:19


Labs: 


                  Abnormal Lab Results - Last 24 Hours (Table)











  01/16/23 01/16/23 01/16/23 Range/Units





  11:42 11:42 16:52 


 


WBC     (3.8-10.6)  k/uL


 


RBC     (3.80-5.40)  m/uL


 


Hgb     (11.4-16.0)  gm/dL


 


Hct     (34.0-46.0)  %


 


Neutrophils #     (1.3-7.7)  k/uL


 


Lymphocytes #     (1.0-4.8)  k/uL


 


POC Glucose (mg/dL)  596 H  412 H  267 H  ()  mg/dL














  01/16/23 01/17/23 01/17/23 Range/Units





  20:39 08:02 11:38 


 


WBC   12.0 H   (3.8-10.6)  k/uL


 


RBC   2.92 L   (3.80-5.40)  m/uL


 


Hgb   9.1 L   (11.4-16.0)  gm/dL


 


Hct   29.0 L   (34.0-46.0)  %


 


Neutrophils #   10.8 H   (1.3-7.7)  k/uL


 


Lymphocytes #   0.6 L   (1.0-4.8)  k/uL


 


POC Glucose (mg/dL)  366 H   263 H  ()  mg/dL














Assessment and Plan


Assessment: 





Acute on chronic hypoxemic respiratory failure secondary to diastolic CHF, and 

COPD exacerbation.





History of atrial fibrillation with rapid ventricular response.





Troponin leak.





Hyperglycemia.





Type 2 diabetes mellitus.





Mild elevation of liver function test.





Severe COPD, oxygen dependent.





History of heavy tobacco dependence.





Hyperlipidemia.





Medication noncompliance.


Plan: 





Plan dated 01/16/2023.





He is on 15 L high flow oxygen.  She does have BiPAP at the room, with settings 

of 12/5 and 50%.  Labs, x-rays, medications are all reviewed.  Prognosis is 

guarded.  We will continue to follow the patient and make recommendations along 

the way.  Continue with current medications.  Prognosis is certainly guarded.  

Antibiotics can probably be discontinued.  Pro-calcitonin level was only 0.09.





Plan dated 01/17/2023.





Yesterday, the patient was on 15 L high flow oxygen.  Today she's been weaned 

down to 13 L.  The patient is resting comfortably.  Not receiving any IV fluids.

 Labs, x-rays, and medications are reviewed.  Most recent pro-calcitonin level 

was 0.09.  We will continue to follow make recommendations along the way.  

Prognosis is certainly guarded.  Antibiotics were finally discontinued.


Time with Patient: Less than 30

## 2023-01-17 NOTE — P.PN
Subjective


Progress Note Date: 01/17/23


Principal diagnosis: 


COPD exacerbation





The patient is an 82-year-old female with a past medical history significant for

hyperlipidemia, A. fib on Eliquis, diabetes mellitus type 2, chronic kidney 

disease, CHF, and COPD there is 5-6 L home O2.  She is a former heavy smoker and

reports quitting 10 years ago.  The patient presented to the emergency 

department on 1/13/23 complaints of worsening dyspnea over 1 week with 

associated cough.  She was hypoxic in the emergency department and placed on 

BiPAP 12/5 100% FiO2.  EKG demonstrated A. fib with RVR, patient was placed on a

Cardizem drip and converted back to a normal sinus rhythm. 





1/13 The patient has just arrived up into her room from the emergency 

department.  She is seen sitting up in bed with a nonrebreather mask on.  She is

alert and oriented 3 and able to  answer questions appropriately.  Information 

regarding palliative care philosophies and services provided.  Education 

regarding her chronic illnesses including COPD, CHF, and diabetes was also 

provided.  The patient states that she is very happy with her current quality of

life.  She is not able to physically do everything that she would like to, but 

has accepted that.  She states it's part of getting old.  She enjoys living with

her granddaughter and reports that she takes great care of her.  Her current 

goal of care is for prolonged survival.  She is aware that there is no cure for 

her COPD and that our treatments are limited.  She wears 5-6 L home O2.  She is 

hoping to respond well to her treatment and return to her previous baseline.  

She wishes to return home to live with her granddaughter.  CODE STATUS reviewed 

in detail.  The patient wishes to remain a DO NOT RESUSCITATE.  We will continue

to follow this patient








Objective





- Vital Signs


Vital signs: 


                                   Vital Signs











Temp  97.7 F   01/17/23 08:25


 


Pulse  85   01/17/23 12:26


 


Resp  19   01/17/23 12:26


 


BP  139/68   01/17/23 12:26


 


Pulse Ox  88 L  01/17/23 12:26


 


FiO2  50   01/16/23 04:00








                                 Intake & Output











 01/16/23 01/17/23 01/17/23





 18:59 06:59 18:59


 


Intake Total 1300  


 


Output Total 


 


Balance 400 -400 -1400


 


Weight 59.7 kg 58 kg 


 


Intake:   


 


  Intake, IV Titration 100  





  Amount   


 


    Sodium Ferric Gluconat- 100  





    Sucrose 125 mg In Sodium   





    Chloride 0.9% 100 ml @   





    100 mls/hr IVPB DAILY [facility]   





    Rx#:[id number]   


 


  Oral 1200  


 


Output:   


 


  Urine 


 


Other:   


 


  Voiding Method External Catheter External Catheter External Catheter


 


  # Bowel Movements  1 














- Exam


General: Well developed, well nourished. No acute distress. Chronically ill 

appearing  


HEENT: Head is atraumatic, normocephalic. 


CV: S1 and S2 present. No clicks, rubs or murmurs.


Lungs: Diminished bases bilaterally.  Respirations even and nonlabored.  On 13 L

high flow nasal cannula


Abdomen/GI: Soft. Bowel sounds present in all 4 quadrants.No guarding, rigidity,

or abdominal tenderness.  


: No suprapubic tenderness.  External catheter present draining clear yellow 

urine.


Musculoskeletal/ Extremities: NAILS, No gross atrophy. + generalized weakness


Skin: Warm and dry


Neurologic: Awake, alert and oriented times 3. CN II-XII grossly intact. No 

focal deficits.


Psychiatric: Appropriate mood and affect.








- Labs


CBC & Chem 7: 


                                 01/17/23 08:02





                                 01/16/23 06:19


Labs: 


                  Abnormal Lab Results - Last 24 Hours (Table)











  01/16/23 01/16/23 01/17/23 Range/Units





  16:52 20:39 08:02 


 


WBC    12.0 H  (3.8-10.6)  k/uL


 


RBC    2.92 L  (3.80-5.40)  m/uL


 


Hgb    9.1 L  (11.4-16.0)  gm/dL


 


Hct    29.0 L  (34.0-46.0)  %


 


Neutrophils #    10.8 H  (1.3-7.7)  k/uL


 


Lymphocytes #    0.6 L  (1.0-4.8)  k/uL


 


POC Glucose (mg/dL)  267 H  366 H   ()  mg/dL














  01/17/23 Range/Units





  11:38 


 


WBC   (3.8-10.6)  k/uL


 


RBC   (3.80-5.40)  m/uL


 


Hgb   (11.4-16.0)  gm/dL


 


Hct   (34.0-46.0)  %


 


Neutrophils #   (1.3-7.7)  k/uL


 


Lymphocytes #   (1.0-4.8)  k/uL


 


POC Glucose (mg/dL)  263 H  ()  mg/dL














Assessment and Plan


Assessment: 


Symptoms


* Pain - 0/10, continue Tylenol when necessary


* Fatigue -  reports good energy level, continue B12


* SOB - + SOB at rest, continue Pulmicort, Perforomist, Lasix, DuoNeb, DuoNeb, 

  and Solu-Medrol


* Insomnia - no


* N/V - no


* Anxiety - yes, continue Xanax


* Depression - occasional


* Confusion - no


* Agitation - no


* Hallucinations - no


* Appetite/weight loss - patient reports having a good appetite, no recent 

  weight loss, continue healthy diet


* Dysphagia - no


* Constipation - occasional, continue Colace and lactulose


* Incontinence - no 


* Itch - no


* Cough - + nonproductive cough














Plan: 


Summary/Goals - the patient is on 15 L high flow O2 and BiPAP intermittently.  

Antibiotics have been discontinued.  The patient states she gets short of breath

on minimal exertion.  She had agreed to outpatient palliative care.  However she

has not made any progress over the weekend.  She has been considering hospice.  

She would like to talk to her daughter.  Attempted to reach her daughter, Mabel, 

via telephone.  Left message on her voicemail.





Recommendations - hospice informational visit





Advanced Directives - none on file





Code Status - DO NOT RESUSCITATE





Thank you for this consultation





Ailyn Solano [facility]


Palliative Care


[facility] [id number]


Email: [email or web address]








Time with Patient: Less than 30

## 2023-01-17 NOTE — P.PN
Subjective


Progress Note Date: 01/17/23








History of present illness:


Patient is an 82-year-old female with a known history of atrial fibrillation,

severe chronic obstructive lung disease, on home O2 who stopped smoking about a 

year ago.We will consult to see the patient for atrial fibrillation.  Patient 

initially presented to the ER.  EKG showed atrial fibrillation with rapid 

ventricular rate of 178.  Patient was started on Cardizem at 5 mg an hour and 

put on BiPAP.  While in the ER patient converted to sinus rhythm.  She is on 

eliquis for anticoagulation.  Heart rate is controlled in the 70s to 80s.  Chest

x-ray showed mild lung opacities likely atelectasis.  Patient had an 

echocardiogram on 12/16/2022 which showed normal sinus rhythm with severe 

tricuspid regurgitation, severe pulmonary hypertension, right atrium 

enlargement, and right ventricular dilation.  She is also currently being 

treated with IV antibiotics for UTI.  Will obtain 2-D echocardiogram.  Continue 

with eliquis





1/16


Patient had another episode of A. fib with RVR started on a Cardizem drip last 

night and subsequently converted to sinus rhythm.  Patient is off Cardizem at 

this time.  Heart rate is in the 70s and /64. PO 91% on 15L HF. She is 

continued on eliquis.  Echocardiogram reveals normal LV systolic function.  

Severe pulmonary hypertension.  Moderate to severe tricuspid regurgitation.  

Mild mitral regurgitation.  Potassium 4.0, BUN 31 creatinine 1.15.





1/17


Patient is continuing to require high flow oxygen at 12 L, pulse ox 93%.  

Patient states that her breathing is better from yesterday.  Patient is on IV 

Lasix 40 mg daily and diuresing well.


Echocardiogram reveals normal LV systolic function.  Severe pulmonary 

hypertension.  Moderate to severe tricuspid regurgitation.  Mild mitral 

regurgitation.


Hemoglobin 9.1.





Physical examination:


Gen: This is an 82-year-old  female.  She is resting in bed appears to 

be comfortable


VS: reviewed


HEENT: Head is atraumatic, normocephalic. Pupils equal, round. Sclerae is 

anicteric. 


NECK: Supple. No JVD.


LUNGS: Scattered rhonchi.  No intercostal retractions.


HEART: Regular rate and rhythm.  Systolic murmur. 


EXTREMITIES: No pedal edema.  No calf tenderness.


NEUROLOGICAL: Patient is awake, alert and oriented x3. 





 





Assessment:


Persistent atrial fibrillation with RVR, currently in sinus rhythm


Acute diastolic heart failure


Lactic acidosis


Chronic kidney disease











Plan:


Continue patient on eliquis 2.5 mg twice daily and Toprol-XL 25 mg daily


Continue Lasix 40 mg IV daily


Monitor electrolytes, renal function, weights and I&O


Further recommendations as patient progresses.





Nurse practitioner note has been reviewed, I agree with documented findings and 

plan of care.  Patient was seen and examined.








Objective





- Vital Signs


Vital signs: 


                                   Vital Signs











Temp  97.7 F   01/17/23 08:25


 


Pulse  82   01/17/23 08:59


 


Resp  18   01/17/23 08:25


 


BP  133/78   01/17/23 08:25


 


Pulse Ox  93 L  01/17/23 08:25


 


FiO2  50   01/16/23 04:00








                                 Intake & Output











 01/16/23 01/17/23 01/17/23





 18:59 06:59 18:59


 


Intake Total 1300  


 


Output Total 900 400 100


 


Balance 400 -400 -100


 


Weight 59.7 kg 58 kg 


 


Intake:   


 


  Intake, IV Titration 100  





  Amount   


 


    Sodium Ferric Gluconat- 100  





    Sucrose 125 mg In Sodium   





    Chloride 0.9% 100 ml @   





    100 mls/hr IVPB DAILY [facility]   





    Rx#:[id number]   


 


  Oral 1200  


 


Output:   


 


  Urine 900 400 100


 


Other:   


 


  Voiding Method External Catheter External Catheter 


 


  # Bowel Movements  1 














- Labs


CBC & Chem 7: 


                                 01/17/23 08:02





                                 01/16/23 06:19


Labs: 


                  Abnormal Lab Results - Last 24 Hours (Table)











  01/16/23 01/16/23 01/16/23 Range/Units





  11:42 11:42 16:52 


 


WBC     (3.8-10.6)  k/uL


 


RBC     (3.80-5.40)  m/uL


 


Hgb     (11.4-16.0)  gm/dL


 


Hct     (34.0-46.0)  %


 


Neutrophils #     (1.3-7.7)  k/uL


 


Lymphocytes #     (1.0-4.8)  k/uL


 


POC Glucose (mg/dL)  596 H  412 H  267 H  ()  mg/dL














  01/16/23 01/17/23 Range/Units





  20:39 08:02 


 


WBC   12.0 H  (3.8-10.6)  k/uL


 


RBC   2.92 L  (3.80-5.40)  m/uL


 


Hgb   9.1 L  (11.4-16.0)  gm/dL


 


Hct   29.0 L  (34.0-46.0)  %


 


Neutrophils #   10.8 H  (1.3-7.7)  k/uL


 


Lymphocytes #   0.6 L  (1.0-4.8)  k/uL


 


POC Glucose (mg/dL)  366 H   ()  mg/dL

## 2023-01-17 NOTE — P.PN
Subjective


Progress Note Date: 01/17/23


This is a pleasant 82 years old female with past medical history of COPD, she 

has also on 6 L of oxygen.


Presents because of worsening dyspnea over one week associated with occasional 

cough and no phlegm.


Patient has central chest pain, nonradiating for the last week, rated as 5/10 in

severity, nonradiating, nonspecific


Patient denies any genitourinary symptoms, no vomiting diarrhea or abdominal 

pain.  No dysuria or urgency.  She has some mild dizziness but no headache, 

weakness or numbness.


She denies smoking alcohol or illicit drugs.  Patient has history of smoking


She cannot remember who is her pulmonologist and she states she doesn't have a 

heart doctor.


Patient is found with A. fib and RVR and emergency room and currently she is on 

Cardizem drip at 10 mg per hour.


Patient is afebrile, she was tachycardic on admission with heart rate up to 190,

currently is 140s.  Patient multiple moderately tachypneic but she's not in 

significant respiratory distress.  Blood pressure 108/84, she is saturating 100%

on BiPAP with pressures of 12/5.


Patient has mild leukocytosis of 13.4, hemoglobin 11.2.  Rest of CBC, INR is 

unremarkable.


Sodium 135, creatinine 1.1


Glucose on admission was 61, currently fifth 176 on the BMP.


Mild hypercalcemia mostly secondary to dehydration, rule out other causes


AST mildly elevated 65 and ALT 60 while removing normal 0.9.  Troponin is 

negative.  ProBNP elevated 6840.


Influenza and covid nondetected


Echocardiogram done on done on 12/16/2022, showing ejection fraction of 55% with

moderate to severe tricuspid regurgitation and moderate to severe pulmonary 

hypertension


Chest x-ray: Vascular and left mid lung andopacity, mostly atelectasis.  

Reviewed the chest x-ray by myself, it shows prominent vascular markings but 

less evident that last month.  No evidence of consolidation.


Patient was started on Cardizem drip, received a bolus of 500 mL, Solu-Medrol 

105 and started on 40 mg.  Lower and insulin.





1/14


Patient seen and examined.  Currently on BiPAP.  States she feels better.  

Denies any blood in the stools.  Case discussed with nursing staff





1/15.  Patient seen and examined.  Continues to be on BiPAP.  States she feels 

better.  Vital signs stable





1/16.  Patient seen and examined.  Currently on 15 L of oxygen.  Discussed with 

patient regarding her poor prognosis, patient is open to hospice.  Palliative 

care on board





1/17.  Patient seen and examined.  Oxygen requirements is slightly improved to 

12 L.  Gets short of breath on minimal exertion.  Poor appetite.  Blood pressure

of 110/68, pulse of 85, respiratory rate of 19








REVIEW OF SYSTEMS: 


CONSTITUTIONAL: No fever, no malaise,. 


CARDIOVASCULAR: No chest pain, no palpitations, no syncope. 


PULMONARY: no cough, gets short of breath on exertion


GASTROINTESTINAL: No diarrhea, no nausea, no vomiting, no abdominal pain. 


NEUROLOGICAL: No headaches, no weakness, 





PHYSICAL EXAMINATION: 





GENERAL: The patient is alert and oriented x3, not in any acute distress. Well 

developed, well nourished. 


HEENT: Pupils are round and equally reacting to light. EOMI. No scleral icterus.

No conjunctival pallor. Normocephalic, atraumatic. No pharyngeal erythema. No 

thyromegaly. 


CARDIOVASCULAR: S1 and S2 present. No murmurs, rubs, or gallops. 


PULMONARY: Diminished breath sounds at bases bilaterally, 


ABDOMEN: Soft, nontender, nondistended, normoactive bowel sounds. No palpable 

organomegaly. 


MUSCULOSKELETAL: No joint swelling or deformity.


EXTREMITIES: No cyanosis, clubbing, or pedal edema. 


NEUROLOGICAL: Gross neurological examination did not reveal any focal deficits. 


SKIN: No rashes. 





Assessment and plan





acute CHF exacerbation.


Acute COPD exacerbation


Acute and chronic hypoxic respiratory failure


A. fib and RVR, present on admission, on Eliquis at home


moderate to severe tricuspid regurgitation and moderate to severe pulmonary 

hypertension


Chronic kidney disease stage III


Diabetes mellitus with Hypoglycemia and hyperglycemia more than 500 on admiss

ion,


Hyperlipidemia


Hypercalcemia


Mild transaminitis


Severe pulmonary hypertension


 moderate to severe tricuspid regurg





Plan: 


Monitor vital signs


Monitor CBC, 


Continue oxygen supplementation


Continue BiPAP as needed


Continue IV Lasix 40 milligrams daily


Continue IV Solu-Medrol


Continue Toprol


Follow-up on cardiology recommendations


Follow-up on pulmonary recommendations


Palliative care on board








Objective





- Vital Signs


Vital signs: 


                                   Vital Signs











Temp  97.7 F   01/17/23 08:25


 


Pulse  85   01/17/23 12:26


 


Resp  19   01/17/23 12:26


 


BP  139/68   01/17/23 12:26


 


Pulse Ox  88 L  01/17/23 12:26


 


FiO2  50   01/16/23 04:00








                                 Intake & Output











 01/16/23 01/17/23 01/17/23





 18:59 06:59 18:59


 


Intake Total 1300  


 


Output Total 


 


Balance 400 -400 -1000


 


Weight 59.7 kg 58 kg 


 


Intake:   


 


  Intake, IV Titration 100  





  Amount   


 


    Sodium Ferric Gluconat- 100  





    Sucrose 125 mg In Sodium   





    Chloride 0.9% 100 ml @   





    100 mls/hr IVPB DAILY [facility]   





    Rx#:[id number]   


 


  Oral 1200  


 


Output:   


 


  Urine 


 


Other:   


 


  Voiding Method External Catheter External Catheter External Catheter


 


  # Bowel Movements  1 














- Labs


CBC & Chem 7: 


                                 01/17/23 08:02





                                 01/16/23 06:19


Labs: 


                  Abnormal Lab Results - Last 24 Hours (Table)











  01/16/23 01/16/23 01/17/23 Range/Units





  16:52 20:39 08:02 


 


WBC    12.0 H  (3.8-10.6)  k/uL


 


RBC    2.92 L  (3.80-5.40)  m/uL


 


Hgb    9.1 L  (11.4-16.0)  gm/dL


 


Hct    29.0 L  (34.0-46.0)  %


 


Neutrophils #    10.8 H  (1.3-7.7)  k/uL


 


Lymphocytes #    0.6 L  (1.0-4.8)  k/uL


 


POC Glucose (mg/dL)  267 H  366 H   ()  mg/dL














  01/17/23 Range/Units





  11:38 


 


WBC   (3.8-10.6)  k/uL


 


RBC   (3.80-5.40)  m/uL


 


Hgb   (11.4-16.0)  gm/dL


 


Hct   (34.0-46.0)  %


 


Neutrophils #   (1.3-7.7)  k/uL


 


Lymphocytes #   (1.0-4.8)  k/uL


 


POC Glucose (mg/dL)  263 H  ()  mg/dL

## 2023-01-18 VITALS — TEMPERATURE: 96.9 F | DIASTOLIC BLOOD PRESSURE: 80 MMHG | SYSTOLIC BLOOD PRESSURE: 143 MMHG

## 2023-01-18 VITALS — RESPIRATION RATE: 14 BRPM

## 2023-01-18 VITALS — HEART RATE: 80 BPM

## 2023-01-18 LAB
ALBUMIN SERPL-MCNC: 3.7 G/DL (ref 3.5–5)
ALP SERPL-CCNC: 121 U/L (ref 38–126)
ALT SERPL-CCNC: 228 U/L (ref 4–34)
ANION GAP SERPL CALC-SCNC: 9 MMOL/L
AST SERPL-CCNC: 191 U/L (ref 14–36)
BUN SERPL-SCNC: 30 MG/DL (ref 7–17)
CALCIUM SPEC-MCNC: 9.4 MG/DL (ref 8.4–10.2)
CHLORIDE SERPL-SCNC: 96 MMOL/L (ref 98–107)
CO2 SERPL-SCNC: 32 MMOL/L (ref 22–30)
ERYTHROCYTE [DISTWIDTH] IN BLOOD BY AUTOMATED COUNT: 3.26 M/UL (ref 3.8–5.4)
ERYTHROCYTE [DISTWIDTH] IN BLOOD: 14.7 % (ref 11.5–15.5)
GLUCOSE BLD-MCNC: 222 MG/DL (ref 70–110)
GLUCOSE SERPL-MCNC: 262 MG/DL (ref 74–99)
HCT VFR BLD AUTO: 32.5 % (ref 34–46)
HGB BLD-MCNC: 10 GM/DL (ref 11.4–16)
MCH RBC QN AUTO: 30.6 PG (ref 25–35)
MCHC RBC AUTO-ENTMCNC: 30.6 G/DL (ref 31–37)
MCV RBC AUTO: 99.8 FL (ref 80–100)
PLATELET # BLD AUTO: 268 K/UL (ref 150–450)
POTASSIUM SERPL-SCNC: 3.8 MMOL/L (ref 3.5–5.1)
PROT SERPL-MCNC: 6.1 G/DL (ref 6.3–8.2)
SODIUM SERPL-SCNC: 137 MMOL/L (ref 137–145)
WBC # BLD AUTO: 12.7 K/UL (ref 3.8–10.6)

## 2023-01-18 RX ADMIN — APIXABAN SCH MG: 2.5 TABLET, FILM COATED ORAL at 08:46

## 2023-01-18 RX ADMIN — FORMOTEROL FUMARATE DIHYDRATE SCH MCG: 20 SOLUTION RESPIRATORY (INHALATION) at 07:27

## 2023-01-18 RX ADMIN — INSULIN ASPART SCH: 100 INJECTION, SOLUTION INTRAVENOUS; SUBCUTANEOUS at 13:00

## 2023-01-18 RX ADMIN — IPRATROPIUM BROMIDE AND ALBUTEROL SULFATE SCH ML: .5; 3 SOLUTION RESPIRATORY (INHALATION) at 14:51

## 2023-01-18 RX ADMIN — INSULIN DETEMIR SCH UNIT: 100 INJECTION, SOLUTION SUBCUTANEOUS at 07:02

## 2023-01-18 RX ADMIN — METHYLPREDNISOLONE SODIUM SUCCINATE SCH MG: 40 INJECTION, POWDER, FOR SOLUTION INTRAMUSCULAR; INTRAVENOUS at 08:46

## 2023-01-18 RX ADMIN — ACETAMINOPHEN PRN MG: 325 TABLET, FILM COATED ORAL at 09:04

## 2023-01-18 RX ADMIN — DAPAGLIFLOZIN SCH MG: 10 TABLET, FILM COATED ORAL at 08:46

## 2023-01-18 RX ADMIN — FUROSEMIDE SCH MG: 10 INJECTION, SOLUTION INTRAMUSCULAR; INTRAVENOUS at 08:46

## 2023-01-18 RX ADMIN — PANTOPRAZOLE SODIUM SCH MG: 40 TABLET, DELAYED RELEASE ORAL at 07:01

## 2023-01-18 RX ADMIN — BUDESONIDE SCH MG: 1 SUSPENSION RESPIRATORY (INHALATION) at 07:27

## 2023-01-18 RX ADMIN — IPRATROPIUM BROMIDE AND ALBUTEROL SULFATE SCH ML: .5; 3 SOLUTION RESPIRATORY (INHALATION) at 07:27

## 2023-01-18 RX ADMIN — METOPROLOL SUCCINATE SCH MG: 25 TABLET, EXTENDED RELEASE ORAL at 08:46

## 2023-01-18 RX ADMIN — ATORVASTATIN CALCIUM SCH MG: 40 TABLET, FILM COATED ORAL at 08:46

## 2023-01-18 RX ADMIN — INSULIN ASPART SCH UNIT: 100 INJECTION, SOLUTION INTRAVENOUS; SUBCUTANEOUS at 07:04

## 2023-01-18 RX ADMIN — IPRATROPIUM BROMIDE AND ALBUTEROL SULFATE SCH ML: .5; 3 SOLUTION RESPIRATORY (INHALATION) at 10:50

## 2023-01-18 RX ADMIN — DOCUSATE SODIUM SCH MG: 100 CAPSULE, LIQUID FILLED ORAL at 08:46

## 2023-01-18 NOTE — P.DS
Providers


Date of admission: 


01/13/23 08:54





Expected date of discharge: 01/18/23


Attending physician: 


Russell Qureshi





Consults: 





                                        





01/13/23 08:54


Consult Physician Urgent 


   Consulting Provider: [facility]


   Consult Reason/Comments: afib with rvr, hx afib


   Do you want consulting provider notified?: Yes





01/13/23 09:07


Consult Physician Urgent 


   Consulting Provider: Gerhard Dacosta


   Consult Reason/Comments: acute/chronic hypoxic resp failure, bipap 

dependance, acute copd


                                                exacerbati


   Do you want consulting provider notified?: Yes





01/13/23 10:37


Consult to Palliative Care Routine 


   Consulting Provider: Ailyn Solano


   Consult Reason/Comments: goals of care


   Do you want consulting provider notified?: Yes





01/13/23 11:04


Consult Physician Routine 


   Consulting Provider: Jose Angel Daly


   Consult Reason/Comments: hypercalcemia ,kid injury is more chronic


   Do you want consulting provider notified?: Yes











Primary care physician: 


Jairo Vail





Hospital Course: 





Discharge diagnoses;


acute CHF exacerbation.


Acute COPD exacerbation


Acute and chronic hypoxic respiratory failure


A. fib and RVR, present on admission, on Eliquis at home


moderate to severe tricuspid regurgitation and moderate to severe pulmonary 

hypertension


Chronic kidney disease stage III


Diabetes mellitus with Hypoglycemia and hyperglycemia more than 500 on 

admission,


Hyperlipidemia


Hypercalcemia


Mild transaminitis


Severe pulmonary hypertension


 moderate to severe tricuspid regurg





Hospital course;


This is a pleasant 82 years old female with past medical history of COPD, she 

has also on 6 L of oxygen.


Presents because of worsening dyspnea over one week associated with occasional 

cough and no phlegm.


Patient has central chest pain, nonradiating for the last week, rated as 5/10 in

severity, nonradiating, nonspecific


Patient denies any genitourinary symptoms, no vomiting diarrhea or abdominal 

pain.  No dysuria or urgency.  She has some mild dizziness but no headache, 

weakness or numbness.


She denies smoking alcohol or illicit drugs.  Patient has history of smoking


She cannot remember who is her pulmonologist and she states she doesn't have a 

heart doctor.


Patient is found with A. fib and RVR and emergency room and currently she is on 

Cardizem drip at 10 mg per hour.


Patient is afebrile, she was tachycardic on admission with heart rate up to 190,

currently is 140s.  Patient multiple moderately tachypneic but she's not in 

significant respiratory distress.  Blood pressure 108/84, she is saturating 100%

on BiPAP with pressures of 12/5.


Patient has mild leukocytosis of 13.4, hemoglobin 11.2.  Rest of CBC, INR is 

unremarkable.


Sodium 135, creatinine 1.1


Glucose on admission was 61, currently fifth 176 on the BMP.


Mild hypercalcemia mostly secondary to dehydration, rule out other causes


AST mildly elevated 65 and ALT 60 while removing normal 0.9.  Troponin is 

negative.  ProBNP elevated 6840.


Influenza and covid nondetected


Echocardiogram done on done on 12/16/2022, showing ejection fraction of 55% with

moderate to severe tricuspid regurgitation and moderate to severe pulmonary 

hypertension


Chest x-ray: Vascular and left mid lung andopacity, mostly atelectasis.  

Reviewed the chest x-ray by myself, it shows prominent vascular markings but 

less evident that last month.  No evidence of consolidation.


Patient was started on Cardizem drip, received a bolus of 500 mL, Solu-Medrol 

105 and started on 40 mg.  Lower and insulin.





1/14


Patient seen and examined.  Currently on BiPAP.  States she feels better.  

Denies any blood in the stools.  Case discussed with nursing staff





1/15.  Patient seen and examined.  Continues to be on BiPAP.  States she feels 

better.  Vital signs stable





1/16.  Patient seen and examined.  Currently on 15 L of oxygen.  Discussed with 

patient regarding her poor prognosis, patient is open to hospice.  Palliative 

care on board





1/17.  Patient seen and examined.  Oxygen requirements is slightly improved to 

12 L.  Gets short of breath on minimal exertion.  Poor appetite.  Blood pressure

of 110/68, pulse of 85, respiratory rate of 19





1/18.  Patient seen.  Plan is for the patient to be discharged home with hospice

.





PHYSICAL EXAMINATION: 





GENERAL: The patient is alert and oriented x3, not in any acute distress. Well 

developed, well nourished. 


HEENT: Pupils are round and equally reacting to light. EOMI. No scleral icterus.

No conjunctival pallor. Normocephalic, atraumatic. No pharyngeal erythema. No 

thyromegaly. 


CARDIOVASCULAR: S1 and S2 present. No murmurs, rubs, or gallops. 


PULMONARY: Coarse breath sounds bilaterally, expiratory wheeze audible


ABDOMEN: Soft, nontender, nondistended, normoactive bowel sounds. No palpable 

organomegaly. 


MUSCULOSKELETAL: No joint swelling or deformity.


EXTREMITIES: No cyanosis, clubbing, or pedal edema. 


NEUROLOGICAL: Gross neurological examination did not reveal any focal deficits. 


SKIN: No rashes. 





Patient Condition at Discharge: Serious





Plan - Discharge Summary


New Discharge Prescriptions: 


Continue


   Ipratropium/Albuter 20-100Mcg [Combivent Respimat 20-100Mcg Inhaler] 1 puff 

INHALATION RT-QID


   Dapagliflozin Propanediol [Farxiga] 10 mg PO DAILY


   Ipratropium-Albuterol Nebulize [Duoneb 0.5 mg-3 mg/3 ml Soln] 3 ml INHALATION

RT-QID PRN  each


     PRN Reason: Shortness Of Breath Or Wheezing


   Atorvastatin [Lipitor] 40 mg PO DAILY 30 Days #30 tab


   Pantoprazole [Protonix] 40 mg PO AC-BRKFST 30 Days #30 tab


   Acetaminophen Tab [Tylenol] 650 mg PO Q6HR PRN  tab


     PRN Reason: Mild Pain Or Fever > 100.5


   Potassium Chloride ER [K-Dur 10] 10 meq PO DAILY


   metFORMIN HCL 1,000 mg PO BID


   Fluticasone Propion/Salmeterol [Advair 250-50 Diskus] 1 puff INHALATION RT-

BID


   Apixaban [Eliquis] 2.5 mg PO BID


   Cyanocobalamin [Vitamin B-12 Injection] 1,000 mcg SQ Q30D


   Semaglutide [Ozempic] 0.5 mg SQ TH


   Docusate [Colace] 100 mg PO BID 30 Days #60 cap


   Ipratropium-Albuterol Nebulize [Duoneb 0.5 mg-3 mg/3 ml Soln] 3 ml INHALATION

RT-QID  each


   Furosemide [Lasix] 20 mg PO DAILY 30 Days #30 tab


Discharge Medication List





Apixaban [Eliquis] 2.5 mg PO BID 12/14/22 [History]


Cyanocobalamin [Vitamin B-12 Injection] 1,000 mcg SQ Q30D 12/14/22 [History]


Dapagliflozin Propanediol [Farxiga] 10 mg PO DAILY 12/14/22 [History]


Fluticasone Propion/Salmeterol [Advair 250-50 Diskus] 1 puff INHALATION RT-BID 

12/14/22 [History]


Ipratropium/Albuter 20-100Mcg [Combivent Respimat 20-100Mcg Inhaler] 1 puff 

INHALATION RT-QID 12/14/22 [History]


Semaglutide [Ozempic] 0.5 mg SQ TH 12/14/22 [History]


Acetaminophen Tab [Tylenol] 650 mg PO Q6HR PRN  tab 12/16/22 [Rx]


Atorvastatin [Lipitor] 40 mg PO DAILY 30 Days #30 tab 12/16/22 [Rx]


Docusate [Colace] 100 mg PO BID 30 Days #60 cap 12/16/22 [Rx]


Furosemide [Lasix] 20 mg PO DAILY 30 Days #30 tab 12/16/22 [Rx]


Ipratropium-Albuterol Nebulize [Duoneb 0.5 mg-3 mg/3 ml Soln] 3 ml INHALATION 

RT-QID  each 12/16/22 [Rx]


Ipratropium-Albuterol Nebulize [Duoneb 0.5 mg-3 mg/3 ml Soln] 3 ml INHALATION 

RT-QID PRN  each 12/16/22 [Rx]


Pantoprazole [Protonix] 40 mg PO AC-BRKFST 30 Days #30 tab 12/16/22 [Rx]


Potassium Chloride ER [K-Dur 10] 10 meq PO DAILY 01/13/23 [History]


metFORMIN HCL 1,000 mg PO BID 01/13/23 [History]








Follow up Appointment(s)/Referral(s): 


Jairo Vail MD [Primary Care Provider] - 1-2 days


[facility], [NON-STAFF] - 


[facility] [NON-STAFF] - 


Discharge Disposition: HOME WITH HOSPICE

## 2023-01-18 NOTE — P.PN
Subjective


Progress Note Date: 01/18/23





This is a very pleasant 82-year-old female patient with a history of atrial 

fibrillation anticoagulated with Eliquis, diabetes mellitus, chronic obstructive

pulmonary disease requiring home oxygen and she normally wears 6 L at home.  She

was brought in for a 1 week history of worsening shortness of breath, chest 

tightness and palpitations and was found to be hypoxemic at 75% on 6 L nasal 

cannula.  She was also found to be in atrial fibrillation with a rapid 

ventricular response.  She apparently had not been taking her medications at 

home.  White count 13.4.  Hemoglobin 11.2.  .8.  Platelets 400.  Sodium 

135.  Potassium 4.9.  Bicarb 17.  BUN 15.  Creatinine 1.17.  Blood sugar 576.  

Lactic acid 7.1.  Anion gap 21.  AST 65.  ALT 60.  Troponin 0.044.  ProBNP 6830.

 Pro-calcitonin 0.09.  Urine with 4+ glucose trace ketones.  Coronavirus not 

detected.  Influenza screen negative.  Chest x-ray shows bibasilar left midlung 

linear airspace opacities.  No focal consolidation.  No pneumothorax.  She is 

seen today in consultation on the selective care unit.  She is currently sitting

up in bed.  Awake and alert.  She does appear short of breath.  She is using 

some accessory muscles.  Arterial blood gases on 100% FiO2 revealed a PaO2 of 

269, pCO2 32, pH 7.44.  She was initially on BiPAP 12/5 and down to 50% FiO2.  

She's been initiated on DuoNeb inhalations, Symbicort, IV Solu-Medrol.  Be sta

rted on Cardizem drip at 10 mg per hour.  She is on antibiotics in the form of 

ceftriaxone.  Anticoagulated with Eliquis.





The patient is seen today 01/14/2023 in follow-up in the selective care unit.  

She was again having increasing oxygen demands and had been titrated up to 15 L 

but still continues saturations in the 70s.  She was subsequently placed on 

BiPAP 12/5 and 60% FiO2 with O2 sat durations improved.  Currently at 97%.  Her 

FiO2 is dialed down to 40%.  She remains awake and alert.  Chest x-ray continues

to show airspace opacities projecting over the heart.  No evidence of 

pneumothorax, focal consolidation or pleural effusion.  Cardiomegaly.  Urine 

culture pending.  White count 11.1.  Hemoglobin 8.5.  .1.  Sodium 131.  

Potassium 4.4.  Bicarb 25.  BUN 19.  Creatinine 1.10.  Glucose 309.  Currently 

in a -560 ML balance.  She remains on Lasix 40 mg IV every 12 hours.  DuoNeb 

inhalations, Pulmicort and Perforomist inhalations, IV Solu-Medrol.  Antibiotics

in the form of ceftriaxone.  Anticoagulated with Eliquis.





The patient is seen today 01/18/2023 in follow-up on the selective care unit.  

She is currently resting comfortably in bed.  She is maintaining O2 saturations 

in the 90s on 12 L high flow nasal cannula.  She did wear her BiPAP last night 

12/5 and 50% FiO2.  She's been slow to progress.  Urine culture revealed no 

growth.  White count 12.7.  Hemoglobin 10.0.  Sodium 137.  Potassium 3.8.  I 

can't 32.  BUN 30.  Creatinine 1.01.  .  .  She is continued on 

DuoNeb inhalations, Pulmicort and Perforomist inhalations, IV Solu-Medrol.  

Remains on IV diuretics.  Currently in a -0.6 L balance. Anticoagulated with 

Eliquis.





Objective





- Vital Signs


Vital signs: 


                                   Vital Signs











Temp  96.9 F L  01/18/23 08:00


 


Pulse  80   01/18/23 11:01


 


Resp  14   01/18/23 04:00


 


BP  143/80   01/18/23 08:00


 


Pulse Ox  96   01/18/23 08:00


 


FiO2  50   01/18/23 04:00








                                 Intake & Output











 01/17/23 01/18/23 01/18/23





 18:59 06:59 18:59


 


Intake Total 960  480


 


Output Total 1400 2200 


 


Balance -440 -2200 480


 


Weight  63 kg 


 


Intake:   


 


  Oral 960  480


 


Output:   


 


  Urine 1400 2200 


 


Other:   


 


  Voiding Method External Catheter External Catheter External Catheter














- Exam





GENERAL EXAM: Alert, pleasant 82-year-old female, on 12 L high flow nasal 

cannula alternating with BiPAP 12/5 and 50% FiO2, fairly comfortable in no 

apparent distress.


HEAD: Normocephalic.


EYES: Normal reaction of pupils, equal size.


NOSE: Clear with pink turbinates.


THROAT: No erythema or exudates.


NECK: No masses, no JVD.


CHEST: No chest wall deformity.


LUNGS: Equal air entry with bilateral scattered rhonchi and end expiratory 

wheeze, diminished.


CVS: S1 and S2 normal with no audible murmur, irregular rhythm.


ABDOMEN: No hepatosplenomegaly, normal bowel sounds, no guarding or rigidity.


SPINE: No scoliosis or deformity


SKIN: No rashes


CENTRAL NERVOUS SYSTEM: No focal deficits, tone is normal in all 4 extremities.


EXTREMITIES: There is no peripheral edema.  No clubbing, no cyanosis.  

Peripheral pulses are intact.





- Labs


CBC & Chem 7: 


                                 01/18/23 08:55





                                 01/18/23 08:55


Labs: 


                  Abnormal Lab Results - Last 24 Hours (Table)











  01/17/23 01/17/23 01/17/23 Range/Units





  11:38 16:51 20:55 


 


WBC     (3.8-10.6)  k/uL


 


RBC     (3.80-5.40)  m/uL


 


Hgb     (11.4-16.0)  gm/dL


 


Hct     (34.0-46.0)  %


 


MCHC     (31.0-37.0)  g/dL


 


Chloride     ()  mmol/L


 


Carbon Dioxide     (22-30)  mmol/L


 


BUN     (7-17)  mg/dL


 


Glucose     (74-99)  mg/dL


 


POC Glucose (mg/dL)  263 H  405 H  224 H  ()  mg/dL


 


AST     (14-36)  U/L


 


ALT     (4-34)  U/L


 


Total Protein     (6.3-8.2)  g/dL














  01/18/23 01/18/23 01/18/23 Range/Units





  06:23 08:55 08:55 


 


WBC   12.7 H   (3.8-10.6)  k/uL


 


RBC   3.26 L   (3.80-5.40)  m/uL


 


Hgb   10.0 L   (11.4-16.0)  gm/dL


 


Hct   32.5 L   (34.0-46.0)  %


 


MCHC   30.6 L   (31.0-37.0)  g/dL


 


Chloride    96 L  ()  mmol/L


 


Carbon Dioxide    32 H  (22-30)  mmol/L


 


BUN    30 H  (7-17)  mg/dL


 


Glucose    262 H  (74-99)  mg/dL


 


POC Glucose (mg/dL)  222 H    ()  mg/dL


 


AST    191 H  (14-36)  U/L


 


ALT    228 H  (4-34)  U/L


 


Total Protein    6.1 L  (6.3-8.2)  g/dL














Assessment and Plan


Assessment: 





Acute on chronic hypoxemic respiratory failure secondary to suspected acute 

exacerbation of diastolic congestive heart failure, acute exacerbation of COPD





Atrial fibrillation with rapid ventricular response, anticoagulated with 

Eliquis.  





Troponin leak





Hyperglycemia with presenting blood sugar of 576 





Diabetes mellitus, type II





Mild transaminitis





Severe chronic obstructive pulmonary disease, oxygen dependent and on 6 L in the

outpatient setting.





History of heavy tobacco dependence however quit approximately 10 years ago





Hyperlipidemia





History of medication noncompliance





Plan:





The patient was seen and evaluated


Labs and medications reviewed


She has been slow to progress


Condition remains guarded


Patient and family may be considering home with hospice


DO NOT RESUSCITATE/DO NOT INTUBATE CODE STATUS





I have personally seen and examined the patient, performed the documentation and

the assessment and plan as written.  Number of minutes spent on the visit: 10.

## 2023-01-18 NOTE — P.PN
Subjective


Progress Note Date: 01/18/23


Principal diagnosis: 


COPD exacerbation





The patient is an 82-year-old female with a past medical history significant for

hyperlipidemia, A. fib on Eliquis, diabetes mellitus type 2, chronic kidney 

disease, CHF, and COPD there is 5-6 L home O2.  She is a former heavy smoker and

reports quitting 10 years ago.  The patient presented to the emergency 

department on 1/13/23 complaints of worsening dyspnea over 1 week with 

associated cough.  She was hypoxic in the emergency department and placed on 

BiPAP 12/5 100% FiO2.  EKG demonstrated A. fib with RVR, patient was placed on a

Cardizem drip and converted back to a normal sinus rhythm. 





1/13 The patient has just arrived up into her room from the emergency 

department.  She is seen sitting up in bed with a nonrebreather mask on.  She is

alert and oriented 3 and able to  answer questions appropriately.  Information 

regarding palliative care philosophies and services provided.  Education 

regarding her chronic illnesses including COPD, CHF, and diabetes was also 

provided.  The patient states that she is very happy with her current quality of

life.  She is not able to physically do everything that she would like to, but 

has accepted that.  She states it's part of getting old.  She enjoys living with

her granddaughter and reports that she takes great care of her.  Her current 

goal of care is for prolonged survival.  She is aware that there is no cure for 

her COPD and that our treatments are limited.  She wears 5-6 L home O2.  She is 

hoping to respond well to her treatment and return to her previous baseline.  

She wishes to return home to live with her granddaughter.  CODE STATUS reviewed 

in detail.  The patient wishes to remain a DO NOT RESUSCITATE.  We will continue

to follow this patient








Objective





- Vital Signs


Vital signs: 


                                   Vital Signs











Temp  96.9 F L  01/18/23 08:00


 


Pulse  80   01/18/23 11:01


 


Resp  14   01/18/23 04:00


 


BP  143/80   01/18/23 08:00


 


Pulse Ox  96   01/18/23 08:00


 


FiO2  50   01/18/23 04:00








                                 Intake & Output











 01/17/23 01/18/23 01/18/23





 18:59 06:59 18:59


 


Intake Total 960  480


 


Output Total 1400 2200 


 


Balance -440 -2200 480


 


Weight  63 kg 


 


Intake:   


 


  Oral 960  480


 


Output:   


 


  Urine 1400 2200 


 


Other:   


 


  Voiding Method External Catheter External Catheter External Catheter














- Exam


General: Well developed, well nourished. No acute distress. Chronically ill 

appearing  


HEENT: Head is atraumatic, normocephalic. 


CV: S1 and S2 present. No clicks, rubs or murmurs.


Lungs: Diminished bases bilaterally.  Respirations even and slightly labored.  

On 12 L high flow nasal cannula


Abdomen/GI: Soft. Bowel sounds present in all 4 quadrants.No guarding, rigidity,

or abdominal tenderness.  


: No suprapubic tenderness.  External catheter present draining clear yellow 

urine.


Musculoskeletal/ Extremities: NAILS, No gross atrophy. + generalized weakness


Skin: Warm and dry


Neurologic: CN II-XII grossly intact. No focal deficits.


Psychiatric: Appropriate mood and affect.








- Labs


CBC & Chem 7: 


                                 01/18/23 08:55





                                 01/18/23 08:55


Labs: 


                  Abnormal Lab Results - Last 24 Hours (Table)











  01/17/23 01/17/23 01/18/23 Range/Units





  16:51 20:55 06:23 


 


WBC     (3.8-10.6)  k/uL


 


RBC     (3.80-5.40)  m/uL


 


Hgb     (11.4-16.0)  gm/dL


 


Hct     (34.0-46.0)  %


 


MCHC     (31.0-37.0)  g/dL


 


Chloride     ()  mmol/L


 


Carbon Dioxide     (22-30)  mmol/L


 


BUN     (7-17)  mg/dL


 


Glucose     (74-99)  mg/dL


 


POC Glucose (mg/dL)  405 H  224 H  222 H  ()  mg/dL


 


AST     (14-36)  U/L


 


ALT     (4-34)  U/L


 


Total Protein     (6.3-8.2)  g/dL














  01/18/23 01/18/23 Range/Units





  08:55 08:55 


 


WBC  12.7 H   (3.8-10.6)  k/uL


 


RBC  3.26 L   (3.80-5.40)  m/uL


 


Hgb  10.0 L   (11.4-16.0)  gm/dL


 


Hct  32.5 L   (34.0-46.0)  %


 


MCHC  30.6 L   (31.0-37.0)  g/dL


 


Chloride   96 L  ()  mmol/L


 


Carbon Dioxide   32 H  (22-30)  mmol/L


 


BUN   30 H  (7-17)  mg/dL


 


Glucose   262 H  (74-99)  mg/dL


 


POC Glucose (mg/dL)    ()  mg/dL


 


AST   191 H  (14-36)  U/L


 


ALT   228 H  (4-34)  U/L


 


Total Protein   6.1 L  (6.3-8.2)  g/dL














Assessment and Plan


Assessment: 


Symptoms


* Pain - 0/10, continue Tylenol when necessary


* Fatigue -  reports good energy level, continue B12


* SOB - + SOB at rest, continue Pulmicort, Perforomist, Lasix, DuoNeb, DuoNeb, 

  and Solu-Medrol


* Insomnia - no


* N/V - no


* Anxiety - yes, continue Xanax


* Depression - occasional


* Confusion - no


* Agitation - no


* Hallucinations - no


* Appetite/weight loss - patient reports having a good appetite, no recent 

  weight loss, continue healthy diet


* Dysphagia - no


* Constipation - occasional, continue Colace and lactulose, LBM 1/17


* Incontinence - no 


* Itch - no


* Cough - + nonproductive cough














Plan: 


Summary/Goals - The patient is somewhat confused.  She states her goal is 

comfort.  She is interested in hospice.  hHe states she has not discussed this 

with her granddaughter, Mabel.  She asked me to call Mabel and discuss going home 

with hospice.  Spoke with Mabel via telephone. She states she already met with 

hospice yesterday.  She plans on bringing her home with hospice today. 





Recommendations - Home with hospice





Advanced Directives - none on file





Code Status - DO NOT RESUSCITATE





Thank you for this consultation





Ailyn Solano [facility]


Palliative Care


Spectralink [phone]


Email: [email or web address]